# Patient Record
Sex: MALE | Race: WHITE | NOT HISPANIC OR LATINO | URBAN - METROPOLITAN AREA
[De-identification: names, ages, dates, MRNs, and addresses within clinical notes are randomized per-mention and may not be internally consistent; named-entity substitution may affect disease eponyms.]

---

## 2017-05-05 ENCOUNTER — OUTPATIENT (OUTPATIENT)
Dept: OUTPATIENT SERVICES | Facility: HOSPITAL | Age: 77
LOS: 1 days | Discharge: HOME | End: 2017-05-05

## 2017-05-05 DIAGNOSIS — R26.0 ATAXIC GAIT: ICD-10-CM

## 2017-05-05 DIAGNOSIS — M15.0 PRIMARY GENERALIZED (OSTEO)ARTHRITIS: ICD-10-CM

## 2017-05-24 ENCOUNTER — OUTPATIENT (OUTPATIENT)
Dept: OUTPATIENT SERVICES | Facility: HOSPITAL | Age: 77
LOS: 1 days | Discharge: HOME | End: 2017-05-24

## 2017-06-22 ENCOUNTER — OUTPATIENT (OUTPATIENT)
Dept: OUTPATIENT SERVICES | Facility: HOSPITAL | Age: 77
LOS: 1 days | Discharge: HOME | End: 2017-06-22
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
## 2017-06-22 DIAGNOSIS — S09.90XA UNSPECIFIED INJURY OF HEAD, INITIAL ENCOUNTER: ICD-10-CM

## 2017-06-26 ENCOUNTER — OUTPATIENT (OUTPATIENT)
Dept: OUTPATIENT SERVICES | Facility: HOSPITAL | Age: 77
LOS: 1 days | Discharge: HOME | End: 2017-06-26
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
## 2017-06-26 DIAGNOSIS — S09.90XA UNSPECIFIED INJURY OF HEAD, INITIAL ENCOUNTER: ICD-10-CM

## 2017-06-28 DIAGNOSIS — Z79.01 LONG TERM (CURRENT) USE OF ANTICOAGULANTS: ICD-10-CM

## 2017-06-28 DIAGNOSIS — I82.91 CHRONIC EMBOLISM AND THROMBOSIS OF UNSPECIFIED VEIN: ICD-10-CM

## 2017-07-11 ENCOUNTER — OUTPATIENT (OUTPATIENT)
Dept: OUTPATIENT SERVICES | Facility: HOSPITAL | Age: 77
LOS: 1 days | Discharge: HOME | End: 2017-07-11
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
## 2017-07-11 DIAGNOSIS — Z79.01 LONG TERM (CURRENT) USE OF ANTICOAGULANTS: ICD-10-CM

## 2017-07-11 DIAGNOSIS — I82.91 CHRONIC EMBOLISM AND THROMBOSIS OF UNSPECIFIED VEIN: ICD-10-CM

## 2017-07-11 DIAGNOSIS — S09.90XA UNSPECIFIED INJURY OF HEAD, INITIAL ENCOUNTER: ICD-10-CM

## 2017-07-21 ENCOUNTER — OUTPATIENT (OUTPATIENT)
Dept: OUTPATIENT SERVICES | Facility: HOSPITAL | Age: 77
LOS: 1 days | Discharge: HOME | End: 2017-07-21

## 2017-07-21 DIAGNOSIS — S09.90XA UNSPECIFIED INJURY OF HEAD, INITIAL ENCOUNTER: ICD-10-CM

## 2017-07-21 DIAGNOSIS — I82.91 CHRONIC EMBOLISM AND THROMBOSIS OF UNSPECIFIED VEIN: ICD-10-CM

## 2017-07-21 DIAGNOSIS — Z79.01 LONG TERM (CURRENT) USE OF ANTICOAGULANTS: ICD-10-CM
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
## 2017-07-28 ENCOUNTER — OUTPATIENT (OUTPATIENT)
Dept: OUTPATIENT SERVICES | Facility: HOSPITAL | Age: 77
LOS: 1 days | Discharge: HOME | End: 2017-07-28

## 2017-07-28 DIAGNOSIS — S09.90XA UNSPECIFIED INJURY OF HEAD, INITIAL ENCOUNTER: ICD-10-CM
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
## 2017-07-28 DIAGNOSIS — I82.91 CHRONIC EMBOLISM AND THROMBOSIS OF UNSPECIFIED VEIN: ICD-10-CM

## 2017-07-28 DIAGNOSIS — Z79.01 LONG TERM (CURRENT) USE OF ANTICOAGULANTS: ICD-10-CM

## 2017-08-14 ENCOUNTER — OUTPATIENT (OUTPATIENT)
Dept: OUTPATIENT SERVICES | Facility: HOSPITAL | Age: 77
LOS: 1 days | Discharge: HOME | End: 2017-08-14

## 2017-08-14 DIAGNOSIS — S09.90XA UNSPECIFIED INJURY OF HEAD, INITIAL ENCOUNTER: ICD-10-CM
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
## 2017-08-17 ENCOUNTER — OUTPATIENT (OUTPATIENT)
Dept: OUTPATIENT SERVICES | Facility: HOSPITAL | Age: 77
LOS: 1 days | Discharge: HOME | End: 2017-08-17
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
## 2017-08-17 DIAGNOSIS — S09.90XA UNSPECIFIED INJURY OF HEAD, INITIAL ENCOUNTER: ICD-10-CM

## 2017-08-22 DIAGNOSIS — H25.89 OTHER AGE-RELATED CATARACT: ICD-10-CM

## 2017-08-22 DIAGNOSIS — E11.9 TYPE 2 DIABETES MELLITUS WITHOUT COMPLICATIONS: ICD-10-CM

## 2017-08-22 DIAGNOSIS — I10 ESSENTIAL (PRIMARY) HYPERTENSION: ICD-10-CM

## 2017-08-23 ENCOUNTER — OUTPATIENT (OUTPATIENT)
Dept: OUTPATIENT SERVICES | Facility: HOSPITAL | Age: 77
LOS: 1 days | Discharge: HOME | End: 2017-08-23

## 2017-08-23 DIAGNOSIS — S09.90XA UNSPECIFIED INJURY OF HEAD, INITIAL ENCOUNTER: ICD-10-CM

## 2017-08-23 DIAGNOSIS — I82.91 CHRONIC EMBOLISM AND THROMBOSIS OF UNSPECIFIED VEIN: ICD-10-CM
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
## 2017-08-23 DIAGNOSIS — Z79.01 LONG TERM (CURRENT) USE OF ANTICOAGULANTS: ICD-10-CM

## 2017-08-30 ENCOUNTER — OUTPATIENT (OUTPATIENT)
Dept: OUTPATIENT SERVICES | Facility: HOSPITAL | Age: 77
LOS: 1 days | Discharge: HOME | End: 2017-08-30

## 2017-08-30 DIAGNOSIS — Z79.01 LONG TERM (CURRENT) USE OF ANTICOAGULANTS: ICD-10-CM

## 2017-08-30 DIAGNOSIS — I82.91 CHRONIC EMBOLISM AND THROMBOSIS OF UNSPECIFIED VEIN: ICD-10-CM
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
## 2017-08-30 DIAGNOSIS — S09.90XA UNSPECIFIED INJURY OF HEAD, INITIAL ENCOUNTER: ICD-10-CM

## 2017-09-06 ENCOUNTER — OUTPATIENT (OUTPATIENT)
Dept: OUTPATIENT SERVICES | Facility: HOSPITAL | Age: 77
LOS: 1 days | Discharge: HOME | End: 2017-09-06

## 2017-09-06 DIAGNOSIS — I82.91 CHRONIC EMBOLISM AND THROMBOSIS OF UNSPECIFIED VEIN: ICD-10-CM

## 2017-09-06 DIAGNOSIS — Z79.01 LONG TERM (CURRENT) USE OF ANTICOAGULANTS: ICD-10-CM
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
## 2017-09-06 DIAGNOSIS — S09.90XA UNSPECIFIED INJURY OF HEAD, INITIAL ENCOUNTER: ICD-10-CM

## 2017-09-20 ENCOUNTER — OUTPATIENT (OUTPATIENT)
Dept: OUTPATIENT SERVICES | Facility: HOSPITAL | Age: 77
LOS: 1 days | Discharge: HOME | End: 2017-09-20

## 2017-09-20 DIAGNOSIS — Z79.01 LONG TERM (CURRENT) USE OF ANTICOAGULANTS: ICD-10-CM

## 2017-09-20 DIAGNOSIS — I82.91 CHRONIC EMBOLISM AND THROMBOSIS OF UNSPECIFIED VEIN: ICD-10-CM
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
## 2017-09-20 DIAGNOSIS — S09.90XA UNSPECIFIED INJURY OF HEAD, INITIAL ENCOUNTER: ICD-10-CM

## 2017-09-27 ENCOUNTER — OUTPATIENT (OUTPATIENT)
Dept: OUTPATIENT SERVICES | Facility: HOSPITAL | Age: 77
LOS: 1 days | Discharge: HOME | End: 2017-09-27

## 2017-09-27 DIAGNOSIS — S09.90XA UNSPECIFIED INJURY OF HEAD, INITIAL ENCOUNTER: ICD-10-CM
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
## 2017-09-27 DIAGNOSIS — Z79.01 LONG TERM (CURRENT) USE OF ANTICOAGULANTS: ICD-10-CM

## 2017-09-27 DIAGNOSIS — I82.91 CHRONIC EMBOLISM AND THROMBOSIS OF UNSPECIFIED VEIN: ICD-10-CM

## 2017-10-11 ENCOUNTER — OUTPATIENT (OUTPATIENT)
Dept: OUTPATIENT SERVICES | Facility: HOSPITAL | Age: 77
LOS: 1 days | Discharge: HOME | End: 2017-10-11
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
## 2017-10-11 DIAGNOSIS — S09.90XA UNSPECIFIED INJURY OF HEAD, INITIAL ENCOUNTER: ICD-10-CM

## 2017-10-11 DIAGNOSIS — Z79.01 LONG TERM (CURRENT) USE OF ANTICOAGULANTS: ICD-10-CM

## 2017-10-11 DIAGNOSIS — I82.91 CHRONIC EMBOLISM AND THROMBOSIS OF UNSPECIFIED VEIN: ICD-10-CM

## 2017-10-18 ENCOUNTER — OUTPATIENT (OUTPATIENT)
Dept: OUTPATIENT SERVICES | Facility: HOSPITAL | Age: 77
LOS: 1 days | Discharge: HOME | End: 2017-10-18

## 2017-10-18 DIAGNOSIS — Z79.01 LONG TERM (CURRENT) USE OF ANTICOAGULANTS: ICD-10-CM

## 2017-10-18 DIAGNOSIS — S09.90XA UNSPECIFIED INJURY OF HEAD, INITIAL ENCOUNTER: ICD-10-CM

## 2017-10-18 DIAGNOSIS — I82.91 CHRONIC EMBOLISM AND THROMBOSIS OF UNSPECIFIED VEIN: ICD-10-CM
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
## 2017-10-26 ENCOUNTER — OUTPATIENT (OUTPATIENT)
Dept: OUTPATIENT SERVICES | Facility: HOSPITAL | Age: 77
LOS: 1 days | Discharge: HOME | End: 2017-10-26

## 2017-10-26 DIAGNOSIS — Z79.01 LONG TERM (CURRENT) USE OF ANTICOAGULANTS: ICD-10-CM

## 2017-10-26 DIAGNOSIS — S09.90XA UNSPECIFIED INJURY OF HEAD, INITIAL ENCOUNTER: ICD-10-CM
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
## 2017-10-26 DIAGNOSIS — I82.91 CHRONIC EMBOLISM AND THROMBOSIS OF UNSPECIFIED VEIN: ICD-10-CM

## 2017-11-08 ENCOUNTER — OUTPATIENT (OUTPATIENT)
Dept: OUTPATIENT SERVICES | Facility: HOSPITAL | Age: 77
LOS: 1 days | Discharge: HOME | End: 2017-11-08

## 2017-11-08 DIAGNOSIS — Z79.01 LONG TERM (CURRENT) USE OF ANTICOAGULANTS: ICD-10-CM

## 2017-11-08 DIAGNOSIS — S09.90XA UNSPECIFIED INJURY OF HEAD, INITIAL ENCOUNTER: ICD-10-CM

## 2017-11-08 DIAGNOSIS — I82.91 CHRONIC EMBOLISM AND THROMBOSIS OF UNSPECIFIED VEIN: ICD-10-CM
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
## 2017-11-29 ENCOUNTER — OUTPATIENT (OUTPATIENT)
Dept: OUTPATIENT SERVICES | Facility: HOSPITAL | Age: 77
LOS: 1 days | Discharge: HOME | End: 2017-11-29

## 2017-11-29 DIAGNOSIS — I82.91 CHRONIC EMBOLISM AND THROMBOSIS OF UNSPECIFIED VEIN: ICD-10-CM

## 2017-11-29 DIAGNOSIS — S09.90XA UNSPECIFIED INJURY OF HEAD, INITIAL ENCOUNTER: ICD-10-CM

## 2017-11-29 DIAGNOSIS — Z79.01 LONG TERM (CURRENT) USE OF ANTICOAGULANTS: ICD-10-CM
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
## 2017-12-06 ENCOUNTER — OUTPATIENT (OUTPATIENT)
Dept: OUTPATIENT SERVICES | Facility: HOSPITAL | Age: 77
LOS: 1 days | Discharge: HOME | End: 2017-12-06

## 2017-12-06 DIAGNOSIS — Z79.01 LONG TERM (CURRENT) USE OF ANTICOAGULANTS: ICD-10-CM

## 2017-12-06 DIAGNOSIS — I82.91 CHRONIC EMBOLISM AND THROMBOSIS OF UNSPECIFIED VEIN: ICD-10-CM
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
## 2017-12-06 DIAGNOSIS — S09.90XA UNSPECIFIED INJURY OF HEAD, INITIAL ENCOUNTER: ICD-10-CM

## 2017-12-15 ENCOUNTER — OUTPATIENT (OUTPATIENT)
Dept: OUTPATIENT SERVICES | Facility: HOSPITAL | Age: 77
LOS: 1 days | Discharge: HOME | End: 2017-12-15

## 2017-12-15 DIAGNOSIS — I82.91 CHRONIC EMBOLISM AND THROMBOSIS OF UNSPECIFIED VEIN: ICD-10-CM

## 2017-12-15 DIAGNOSIS — Z79.01 LONG TERM (CURRENT) USE OF ANTICOAGULANTS: ICD-10-CM
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
## 2017-12-15 DIAGNOSIS — S09.90XA UNSPECIFIED INJURY OF HEAD, INITIAL ENCOUNTER: ICD-10-CM

## 2017-12-21 ENCOUNTER — OUTPATIENT (OUTPATIENT)
Dept: OUTPATIENT SERVICES | Facility: HOSPITAL | Age: 77
LOS: 1 days | Discharge: HOME | End: 2017-12-21

## 2017-12-21 DIAGNOSIS — I82.91 CHRONIC EMBOLISM AND THROMBOSIS OF UNSPECIFIED VEIN: ICD-10-CM
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
## 2017-12-21 DIAGNOSIS — Z79.01 LONG TERM (CURRENT) USE OF ANTICOAGULANTS: ICD-10-CM

## 2017-12-21 DIAGNOSIS — S09.90XA UNSPECIFIED INJURY OF HEAD, INITIAL ENCOUNTER: ICD-10-CM

## 2018-01-03 ENCOUNTER — OUTPATIENT (OUTPATIENT)
Dept: OUTPATIENT SERVICES | Facility: HOSPITAL | Age: 78
LOS: 1 days | Discharge: HOME | End: 2018-01-03

## 2018-01-03 DIAGNOSIS — S09.90XA UNSPECIFIED INJURY OF HEAD, INITIAL ENCOUNTER: ICD-10-CM

## 2018-01-03 DIAGNOSIS — I82.91 CHRONIC EMBOLISM AND THROMBOSIS OF UNSPECIFIED VEIN: ICD-10-CM

## 2018-01-03 DIAGNOSIS — Z79.01 LONG TERM (CURRENT) USE OF ANTICOAGULANTS: ICD-10-CM
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
## 2018-01-10 ENCOUNTER — OUTPATIENT (OUTPATIENT)
Dept: OUTPATIENT SERVICES | Facility: HOSPITAL | Age: 78
LOS: 1 days | Discharge: HOME | End: 2018-01-10

## 2018-01-10 DIAGNOSIS — I82.91 CHRONIC EMBOLISM AND THROMBOSIS OF UNSPECIFIED VEIN: ICD-10-CM

## 2018-01-10 DIAGNOSIS — S09.90XA UNSPECIFIED INJURY OF HEAD, INITIAL ENCOUNTER: ICD-10-CM

## 2018-01-10 DIAGNOSIS — Z79.01 LONG TERM (CURRENT) USE OF ANTICOAGULANTS: ICD-10-CM
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
## 2018-01-18 ENCOUNTER — OUTPATIENT (OUTPATIENT)
Dept: OUTPATIENT SERVICES | Facility: HOSPITAL | Age: 78
LOS: 1 days | Discharge: HOME | End: 2018-01-18
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
## 2018-01-18 DIAGNOSIS — Z79.01 LONG TERM (CURRENT) USE OF ANTICOAGULANTS: ICD-10-CM

## 2018-01-18 DIAGNOSIS — S09.90XA UNSPECIFIED INJURY OF HEAD, INITIAL ENCOUNTER: ICD-10-CM

## 2018-01-18 DIAGNOSIS — I82.91 CHRONIC EMBOLISM AND THROMBOSIS OF UNSPECIFIED VEIN: ICD-10-CM

## 2018-01-31 ENCOUNTER — OUTPATIENT (OUTPATIENT)
Dept: OUTPATIENT SERVICES | Facility: HOSPITAL | Age: 78
LOS: 1 days | Discharge: HOME | End: 2018-01-31

## 2018-01-31 DIAGNOSIS — Z79.01 LONG TERM (CURRENT) USE OF ANTICOAGULANTS: ICD-10-CM

## 2018-01-31 DIAGNOSIS — I82.91 CHRONIC EMBOLISM AND THROMBOSIS OF UNSPECIFIED VEIN: ICD-10-CM

## 2018-02-04 DIAGNOSIS — S09.90XA UNSPECIFIED INJURY OF HEAD, INITIAL ENCOUNTER: ICD-10-CM
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
## 2018-02-07 ENCOUNTER — OUTPATIENT (OUTPATIENT)
Dept: OUTPATIENT SERVICES | Facility: HOSPITAL | Age: 78
LOS: 1 days | Discharge: HOME | End: 2018-02-07

## 2018-02-07 DIAGNOSIS — I82.91 CHRONIC EMBOLISM AND THROMBOSIS OF UNSPECIFIED VEIN: ICD-10-CM

## 2018-02-07 DIAGNOSIS — Z79.01 LONG TERM (CURRENT) USE OF ANTICOAGULANTS: ICD-10-CM

## 2018-02-14 ENCOUNTER — OUTPATIENT (OUTPATIENT)
Dept: OUTPATIENT SERVICES | Facility: HOSPITAL | Age: 78
LOS: 1 days | Discharge: HOME | End: 2018-02-14

## 2018-02-14 DIAGNOSIS — Z79.01 LONG TERM (CURRENT) USE OF ANTICOAGULANTS: ICD-10-CM

## 2018-02-14 DIAGNOSIS — I82.91 CHRONIC EMBOLISM AND THROMBOSIS OF UNSPECIFIED VEIN: ICD-10-CM

## 2018-02-21 ENCOUNTER — OUTPATIENT (OUTPATIENT)
Dept: OUTPATIENT SERVICES | Facility: HOSPITAL | Age: 78
LOS: 1 days | Discharge: HOME | End: 2018-02-21

## 2018-02-21 DIAGNOSIS — Z79.01 LONG TERM (CURRENT) USE OF ANTICOAGULANTS: ICD-10-CM

## 2018-02-21 DIAGNOSIS — I82.91 CHRONIC EMBOLISM AND THROMBOSIS OF UNSPECIFIED VEIN: ICD-10-CM

## 2018-03-07 ENCOUNTER — OUTPATIENT (OUTPATIENT)
Dept: OUTPATIENT SERVICES | Facility: HOSPITAL | Age: 78
LOS: 1 days | Discharge: HOME | End: 2018-03-07

## 2018-03-07 DIAGNOSIS — I82.91 CHRONIC EMBOLISM AND THROMBOSIS OF UNSPECIFIED VEIN: ICD-10-CM

## 2018-03-07 DIAGNOSIS — Z79.01 LONG TERM (CURRENT) USE OF ANTICOAGULANTS: ICD-10-CM

## 2018-03-23 ENCOUNTER — OUTPATIENT (OUTPATIENT)
Dept: OUTPATIENT SERVICES | Facility: HOSPITAL | Age: 78
LOS: 1 days | Discharge: HOME | End: 2018-03-23

## 2018-03-23 DIAGNOSIS — I82.91 CHRONIC EMBOLISM AND THROMBOSIS OF UNSPECIFIED VEIN: ICD-10-CM

## 2018-03-23 DIAGNOSIS — Z79.01 LONG TERM (CURRENT) USE OF ANTICOAGULANTS: ICD-10-CM

## 2018-04-11 ENCOUNTER — OUTPATIENT (OUTPATIENT)
Dept: OUTPATIENT SERVICES | Facility: HOSPITAL | Age: 78
LOS: 1 days | Discharge: HOME | End: 2018-04-11

## 2018-04-11 DIAGNOSIS — Z79.01 LONG TERM (CURRENT) USE OF ANTICOAGULANTS: ICD-10-CM

## 2018-04-11 DIAGNOSIS — I82.91 CHRONIC EMBOLISM AND THROMBOSIS OF UNSPECIFIED VEIN: ICD-10-CM

## 2018-04-25 ENCOUNTER — OUTPATIENT (OUTPATIENT)
Dept: OUTPATIENT SERVICES | Facility: HOSPITAL | Age: 78
LOS: 1 days | Discharge: HOME | End: 2018-04-25

## 2018-04-25 DIAGNOSIS — I82.91 CHRONIC EMBOLISM AND THROMBOSIS OF UNSPECIFIED VEIN: ICD-10-CM

## 2018-04-25 DIAGNOSIS — Z79.01 LONG TERM (CURRENT) USE OF ANTICOAGULANTS: ICD-10-CM

## 2018-05-17 ENCOUNTER — OUTPATIENT (OUTPATIENT)
Dept: OUTPATIENT SERVICES | Facility: HOSPITAL | Age: 78
LOS: 1 days | Discharge: HOME | End: 2018-05-17

## 2018-05-17 DIAGNOSIS — I82.91 CHRONIC EMBOLISM AND THROMBOSIS OF UNSPECIFIED VEIN: ICD-10-CM

## 2018-05-17 DIAGNOSIS — Z79.01 LONG TERM (CURRENT) USE OF ANTICOAGULANTS: ICD-10-CM

## 2018-05-17 DIAGNOSIS — I48.91 UNSPECIFIED ATRIAL FIBRILLATION: ICD-10-CM

## 2018-06-06 ENCOUNTER — OUTPATIENT (OUTPATIENT)
Dept: OUTPATIENT SERVICES | Facility: HOSPITAL | Age: 78
LOS: 1 days | Discharge: HOME | End: 2018-06-06

## 2018-06-06 DIAGNOSIS — Z79.01 LONG TERM (CURRENT) USE OF ANTICOAGULANTS: ICD-10-CM

## 2018-06-06 DIAGNOSIS — I82.91 CHRONIC EMBOLISM AND THROMBOSIS OF UNSPECIFIED VEIN: ICD-10-CM

## 2018-06-20 ENCOUNTER — OUTPATIENT (OUTPATIENT)
Dept: OUTPATIENT SERVICES | Facility: HOSPITAL | Age: 78
LOS: 1 days | Discharge: HOME | End: 2018-06-20

## 2018-06-20 DIAGNOSIS — I82.91 CHRONIC EMBOLISM AND THROMBOSIS OF UNSPECIFIED VEIN: ICD-10-CM

## 2018-06-20 DIAGNOSIS — Z79.01 LONG TERM (CURRENT) USE OF ANTICOAGULANTS: ICD-10-CM

## 2018-06-29 ENCOUNTER — OUTPATIENT (OUTPATIENT)
Dept: OUTPATIENT SERVICES | Facility: HOSPITAL | Age: 78
LOS: 1 days | Discharge: HOME | End: 2018-06-29

## 2018-06-29 DIAGNOSIS — I82.91 CHRONIC EMBOLISM AND THROMBOSIS OF UNSPECIFIED VEIN: ICD-10-CM

## 2018-06-29 DIAGNOSIS — Z79.01 LONG TERM (CURRENT) USE OF ANTICOAGULANTS: ICD-10-CM

## 2018-07-06 ENCOUNTER — OUTPATIENT (OUTPATIENT)
Dept: OUTPATIENT SERVICES | Facility: HOSPITAL | Age: 78
LOS: 1 days | Discharge: HOME | End: 2018-07-06

## 2018-07-06 DIAGNOSIS — I82.91 CHRONIC EMBOLISM AND THROMBOSIS OF UNSPECIFIED VEIN: ICD-10-CM

## 2018-07-06 DIAGNOSIS — Z79.01 LONG TERM (CURRENT) USE OF ANTICOAGULANTS: ICD-10-CM

## 2018-07-20 ENCOUNTER — OUTPATIENT (OUTPATIENT)
Dept: OUTPATIENT SERVICES | Facility: HOSPITAL | Age: 78
LOS: 1 days | Discharge: HOME | End: 2018-07-20

## 2018-07-20 DIAGNOSIS — I82.91 CHRONIC EMBOLISM AND THROMBOSIS OF UNSPECIFIED VEIN: ICD-10-CM

## 2018-07-20 DIAGNOSIS — Z79.01 LONG TERM (CURRENT) USE OF ANTICOAGULANTS: ICD-10-CM

## 2018-07-25 ENCOUNTER — OUTPATIENT (OUTPATIENT)
Dept: OUTPATIENT SERVICES | Facility: HOSPITAL | Age: 78
LOS: 1 days | Discharge: HOME | End: 2018-07-25

## 2018-07-25 DIAGNOSIS — Z79.01 LONG TERM (CURRENT) USE OF ANTICOAGULANTS: ICD-10-CM

## 2018-07-25 DIAGNOSIS — I82.91 CHRONIC EMBOLISM AND THROMBOSIS OF UNSPECIFIED VEIN: ICD-10-CM

## 2018-08-01 ENCOUNTER — OUTPATIENT (OUTPATIENT)
Dept: OUTPATIENT SERVICES | Facility: HOSPITAL | Age: 78
LOS: 1 days | Discharge: HOME | End: 2018-08-01

## 2018-08-01 DIAGNOSIS — I82.91 CHRONIC EMBOLISM AND THROMBOSIS OF UNSPECIFIED VEIN: ICD-10-CM

## 2018-08-01 DIAGNOSIS — Z79.01 LONG TERM (CURRENT) USE OF ANTICOAGULANTS: ICD-10-CM

## 2018-08-15 ENCOUNTER — EMERGENCY (EMERGENCY)
Facility: HOSPITAL | Age: 78
LOS: 0 days | Discharge: HOME | End: 2018-08-15
Attending: EMERGENCY MEDICINE | Admitting: EMERGENCY MEDICINE

## 2018-08-15 ENCOUNTER — OUTPATIENT (OUTPATIENT)
Dept: OUTPATIENT SERVICES | Facility: HOSPITAL | Age: 78
LOS: 1 days | Discharge: HOME | End: 2018-08-15

## 2018-08-15 VITALS
TEMPERATURE: 99 F | SYSTOLIC BLOOD PRESSURE: 189 MMHG | HEART RATE: 80 BPM | OXYGEN SATURATION: 97 % | WEIGHT: 250 LBS | RESPIRATION RATE: 19 BRPM | DIASTOLIC BLOOD PRESSURE: 106 MMHG | HEIGHT: 72 IN
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
## 2018-08-15 DIAGNOSIS — I82.91 CHRONIC EMBOLISM AND THROMBOSIS OF UNSPECIFIED VEIN: ICD-10-CM

## 2018-08-15 DIAGNOSIS — S90.32XA CONTUSION OF LEFT FOOT, INITIAL ENCOUNTER: ICD-10-CM

## 2018-08-15 DIAGNOSIS — E11.9 TYPE 2 DIABETES MELLITUS WITHOUT COMPLICATIONS: ICD-10-CM

## 2018-08-15 DIAGNOSIS — Z79.01 LONG TERM (CURRENT) USE OF ANTICOAGULANTS: ICD-10-CM
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
## 2018-08-15 DIAGNOSIS — R79.1 ABNORMAL COAGULATION PROFILE: ICD-10-CM
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
## 2018-08-15 DIAGNOSIS — S90.822A BLISTER (NONTHERMAL), LEFT FOOT, INITIAL ENCOUNTER: ICD-10-CM

## 2018-08-15 DIAGNOSIS — M79.673 PAIN IN UNSPECIFIED FOOT: ICD-10-CM

## 2018-08-15 DIAGNOSIS — Z79.84 LONG TERM (CURRENT) USE OF ORAL HYPOGLYCEMIC DRUGS: ICD-10-CM
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
## 2018-08-15 NOTE — ED ADULT NURSE NOTE - CAS TRG GENERAL NORM CIRC DET
Capillary refill less/equal to 2 seconds/Bounding peripheral pulses/Strong peripheral pulses/No visible significant external bleeding

## 2018-08-15 NOTE — ED PROVIDER NOTE - ATTENDING CONTRIBUTION TO CARE
Pt is a 77yo male who comes in for L foot blistering and pain.  He tripped and hit a curb yesterdaty.  Still ambulatory.  Went to Coumadin clinic today and INR was 5.3 - was told to hold coumadin for 3d.  No other complaints.    Exam: 2+ DP pulse, L dorsum of foot by toes blood blister, soreness without pain on axial loading, no ankle tenderness  Imp: contusion with blood blisters - supratherapeutic INR  Plan: dc home with darco shoe

## 2018-08-15 NOTE — ED ADULT NURSE NOTE - NSIMPLEMENTINTERV_GEN_ALL_ED
Implemented All Universal Safety Interventions:  [place] to call system. Call bell, personal items and telephone within reach. Instruct patient to call for assistance. Room bathroom lighting operational. Non-slip footwear when patient is off stretcher. Physically safe environment: no spills, clutter or unnecessary equipment. Stretcher in lowest position, wheels locked, appropriate side rails in place.

## 2018-08-15 NOTE — ED PROVIDER NOTE - NS ED ROS FT
Constitutional: See HPI.  Eyes: No visual changes, eye pain or discharge.  ENMT: No hearing changes, pain, discharge or infections. No neck pain or stiffness.  Cardiac: No chest pain, SOB or edema. No chest pain with exertion.  Respiratory: No cough or respiratory distress.   GI: No nausea, vomiting, diarrhea or abdominal pain.  : No dysuria, frequency or burning.  MS: + foot pain as per HPI. + chronic neuropathy. No myalgia, muscle weakness, joint pain or back pain.  Neuro: No headache or weakness. No LOC.  Skin: No skin rash.

## 2018-08-15 NOTE — ED ADULT NURSE NOTE - PAIN RATING/NUMBER SCALE (0-10): REST
Approximately 150-200cc soap suds enema administered posteriorly per Dr. Maharaj Dates to avoid the suture line, pt having difficulty keeping the fluid in, bedside commode at bedside.
4

## 2018-08-15 NOTE — ED PROVIDER NOTE - PHYSICAL EXAMINATION
CONSTITUTIONAL: Well-developed; well-nourished; in no acute distress.   SKIN: warm, dry  CARD: S1, S2 normal; no murmurs, gallops, or rubs. Regular rate and rhythm.   RESP: No wheezes, rales or rhonchi.  ABD: soft ntnd  EXT: LLE: + ecchymosis over the distal aspect of the left foot with mild ttp, no bony crepitus. Distal neurovascular intact. No ankle tenderness with FROM. No clubbing, cyanosis or edema.   NEURO: Alert, oriented, grossly unremarkable  PSYCH: Cooperative, appropriate.

## 2018-08-15 NOTE — ED PROVIDER NOTE - OBJECTIVE STATEMENT
77 y/o male with PMH of afib on coumadin, DM on metformin who present sot Ed for bruising to the left foot.; Pt states he had a mechanical trip and fall yesterday on a curb and hit the left foot. Today, pt noted bruising and pain ot the foot prompting ED visit. No head injury or LOC.

## 2018-08-15 NOTE — ED PROVIDER NOTE - NSCAREINITIATED _GEN_ER
Dr. Kinsey-I called pt Re:lab results and informed him of new thyroid dosage.  He is going to go ahead and  new strength at local pharmacy, but can we also send to [facility] with other meds he needs? (I think you send the others, but he said there was a problem at [facility] and they need to be resubmitted, please).  Thanks.    Jamie Mahmood(Attending)

## 2018-08-15 NOTE — ED PROVIDER NOTE - CARE PLAN
Principal Discharge DX:	Contusion of left foot, initial encounter  Secondary Diagnosis:	Blood blister  Secondary Diagnosis:	Supratherapeutic INR

## 2018-08-17 ENCOUNTER — OUTPATIENT (OUTPATIENT)
Dept: OUTPATIENT SERVICES | Facility: HOSPITAL | Age: 78
LOS: 1 days | Discharge: HOME | End: 2018-08-17

## 2018-08-17 DIAGNOSIS — I82.91 CHRONIC EMBOLISM AND THROMBOSIS OF UNSPECIFIED VEIN: ICD-10-CM

## 2018-08-17 DIAGNOSIS — Z02.9 ENCOUNTER FOR ADMINISTRATIVE EXAMINATIONS, UNSPECIFIED: ICD-10-CM

## 2018-08-17 DIAGNOSIS — Z79.01 LONG TERM (CURRENT) USE OF ANTICOAGULANTS: ICD-10-CM

## 2018-09-06 PROBLEM — Z00.00 ENCOUNTER FOR PREVENTIVE HEALTH EXAMINATION: Status: ACTIVE | Noted: 2018-09-06

## 2018-09-25 ENCOUNTER — APPOINTMENT (OUTPATIENT)
Dept: VASCULAR SURGERY | Facility: CLINIC | Age: 78
End: 2018-09-25

## 2020-02-13 ENCOUNTER — APPOINTMENT (OUTPATIENT)
Dept: NEUROLOGY | Facility: CLINIC | Age: 80
End: 2020-02-13
Payer: MEDICARE

## 2020-02-13 VITALS
TEMPERATURE: 97.7 F | HEART RATE: 76 BPM | WEIGHT: 240 LBS | SYSTOLIC BLOOD PRESSURE: 147 MMHG | OXYGEN SATURATION: 98 % | DIASTOLIC BLOOD PRESSURE: 84 MMHG | BODY MASS INDEX: 32.51 KG/M2 | HEIGHT: 72 IN

## 2020-02-13 DIAGNOSIS — E11.42 TYPE 2 DIABETES MELLITUS WITH DIABETIC POLYNEUROPATHY: ICD-10-CM

## 2020-02-13 PROCEDURE — 99213 OFFICE O/P EST LOW 20 MIN: CPT

## 2020-02-13 NOTE — PHYSICAL EXAM
[FreeTextEntry1] : Neurological Exam: \par Mental status: Awake, alert and oriented x3.  Recent and remote memory intact.  Naming, repetition and comprehension intact.  Attention/concentration intact.  No dysarthria, no aphasia.  Fund of knowledge appropriate.  \par Cranial nerves: Pupils equally round and reactive to light, visual fields full, no nystagmus, extraocular muscles intact, V1 through V3 intact bilaterally and symmetric, face symmetric, hearing intact to finger rub, palate elevation symmetric, tongue was midline.\par Motor:  MRC grading 5/5 b/l UE/LE.   strength 5/5.  Normal tone and bulk.  No abnormal movements.  \par Sensation: Intact to light touch, proprioception, and pinprick. \par Coordination: No dysmetria on finger-to-nose and heel-to-shin.  No dysdiadokinesia.\par Reflexes: hyporeflexic \par Gait: cautious and ambulates with a cane\par \par

## 2020-02-13 NOTE — ASSESSMENT
[FreeTextEntry1] : 78 yo man with diabetic polyneuropathy refractory to high dose gabapentin \par patient failed lyrica in the past \par \par will try cymbalta 60 mg qd\par pt will call me in 2 weeks at which time i will begin neurontin wean

## 2020-02-13 NOTE — HISTORY OF PRESENT ILLNESS
[FreeTextEntry1] : patient is a 80 yo man with hx of diabetic polyneuropathy who returns for follow up today\par patient was previously seen by dr valles and was last seen about 2.5 yr ago\par patient was previously maintained on gabapentin \par patient reports that since last visit, he reports that his feet hurt and he falls down a lot. his hands are numb and tingling as well\par he takes gabapentin 1200 mg q 12\par has tried marijuana, recreational and legal, which did not help\par he did try lyrica which did not help and he couldn’t afford it \par he thinks he may have been on cymbalta but is not sure \par other pmh: prostate cancer, a fib on eliquis \par meds as below

## 2020-12-25 ENCOUNTER — TRANSCRIPTION ENCOUNTER (OUTPATIENT)
Age: 80
End: 2020-12-25

## 2021-01-06 ENCOUNTER — TRANSCRIPTION ENCOUNTER (OUTPATIENT)
Age: 81
End: 2021-01-06

## 2021-08-26 ENCOUNTER — APPOINTMENT (OUTPATIENT)
Dept: NEUROLOGY | Facility: CLINIC | Age: 81
End: 2021-08-26
Payer: MEDICARE

## 2021-08-26 VITALS
SYSTOLIC BLOOD PRESSURE: 149 MMHG | HEART RATE: 69 BPM | BODY MASS INDEX: 32.23 KG/M2 | OXYGEN SATURATION: 98 % | TEMPERATURE: 98.1 F | HEIGHT: 72 IN | DIASTOLIC BLOOD PRESSURE: 73 MMHG | WEIGHT: 238 LBS

## 2021-08-26 PROCEDURE — 99215 OFFICE O/P EST HI 40 MIN: CPT

## 2021-08-26 NOTE — ASSESSMENT
[FreeTextEntry1] : 80 yo RHM w/ h/o AFib on Eliquis, DM, HTN, diabetic neuropathy currently refractory to gabapentin.  PT never filled script for duloxetine therefore recommend duloxetine trial 60 mg QD.  May continue gabapentin at current dose.  Deferring PT despite recommendations.  May see pain specialist for JULIANNA but unlikely to alleviate neuropathic symptoms since also has symptoms in the hands as well.  \par \par Recommendations:\par - continue gabapentin 600 mg BID\par - duloxetine trial 60 mg QD\par - PT if pt willing\par - if doing well with duloxetine, may consider tapering gabapentin\par - RTC in 4 months

## 2021-08-26 NOTE — PHYSICAL EXAM
[FreeTextEntry1] : NAD.  AOx3.  Intact memory.  Speech fluent, nondysarthric.  CN 2 – 12 normal.\par Strength 5/5 b/l UE/LE.  NL tone, bulk.  No abnl movements.  DTRs 0+ LE, 1+ UE.  Plantar response downgoing b/l.  (-) Hoffmans, clonus.  Sensory decreased LT/PP, pain, temp, proprioception and vibration LE to upper 2/3rd shin, decreased fingertips to mid-hand.  NL FTN/HKS.  No dysdiadokinesia.  Gait narrow based uses cane. \par \par

## 2021-08-26 NOTE — HISTORY OF PRESENT ILLNESS
[FreeTextEntry1] : Since his last visit with Dr. Granados, pt has not tried duloxetine despite recommendations due to concerns regarding price.  Can't recall if he took pregabalin in the past but may have caused weight gain.  Has not had improvement with gabapentin 1200 BID so decreased medications to 600 mg BID on his own without any significant changes.  Still has sensation of "walking on marbles" and also has numbness and pinprick sensation in the fingertips with numbness in the legs to upper 2/3rd of the leg still below knees.  Denies any recent falls.  Never had PT in the past and currently deferring PT.  Is otherwise in his usual state of health.  Has appt with pain specialist for JULIANNA since he was told symptoms may be due to back.

## 2023-09-18 ENCOUNTER — APPOINTMENT (OUTPATIENT)
Dept: MEDICATION MANAGEMENT | Facility: CLINIC | Age: 83
End: 2023-09-18

## 2023-09-18 ENCOUNTER — OUTPATIENT (OUTPATIENT)
Dept: OUTPATIENT SERVICES | Facility: HOSPITAL | Age: 83
LOS: 1 days | End: 2023-09-18
Payer: MEDICARE

## 2023-09-18 DIAGNOSIS — I82.91 CHRONIC EMBOLISM AND THROMBOSIS OF UNSPECIFIED VEIN: ICD-10-CM

## 2023-09-18 DIAGNOSIS — Z79.01 LONG TERM (CURRENT) USE OF ANTICOAGULANTS: ICD-10-CM

## 2023-09-18 LAB
INR PPP: 1.1 RATIO
POCT-PROTHROMBIN TIME: 12.3 SECS
QUALITY CONTROL: YES

## 2023-09-18 PROCEDURE — 99211 OFF/OP EST MAY X REQ PHY/QHP: CPT

## 2023-09-18 PROCEDURE — 36416 COLLJ CAPILLARY BLOOD SPEC: CPT

## 2023-09-18 PROCEDURE — 85610 PROTHROMBIN TIME: CPT

## 2023-09-19 DIAGNOSIS — I82.91 CHRONIC EMBOLISM AND THROMBOSIS OF UNSPECIFIED VEIN: ICD-10-CM

## 2023-09-19 DIAGNOSIS — Z79.01 LONG TERM (CURRENT) USE OF ANTICOAGULANTS: ICD-10-CM

## 2023-09-22 ENCOUNTER — APPOINTMENT (OUTPATIENT)
Dept: MEDICATION MANAGEMENT | Facility: CLINIC | Age: 83
End: 2023-09-22

## 2023-09-22 ENCOUNTER — OUTPATIENT (OUTPATIENT)
Dept: OUTPATIENT SERVICES | Facility: HOSPITAL | Age: 83
LOS: 1 days | End: 2023-09-22
Payer: MEDICARE

## 2023-09-22 DIAGNOSIS — Z51.81 ENCOUNTER FOR THERAPEUTIC DRUG LVL MONITORING: ICD-10-CM

## 2023-09-22 DIAGNOSIS — Z79.01 LONG TERM (CURRENT) USE OF ANTICOAGULANTS: ICD-10-CM

## 2023-09-22 DIAGNOSIS — Z79.01 ENCOUNTER FOR THERAPEUTIC DRUG LVL MONITORING: ICD-10-CM

## 2023-09-22 DIAGNOSIS — I82.91 CHRONIC EMBOLISM AND THROMBOSIS OF UNSPECIFIED VEIN: ICD-10-CM

## 2023-09-22 DIAGNOSIS — I10 ESSENTIAL (PRIMARY) HYPERTENSION: ICD-10-CM

## 2023-09-22 DIAGNOSIS — I48.91 UNSPECIFIED ATRIAL FIBRILLATION: ICD-10-CM

## 2023-09-22 LAB
INR PPP: 2.3 RATIO
POCT-PROTHROMBIN TIME: 27.6 SECS
QUALITY CONTROL: YES

## 2023-09-22 PROCEDURE — 85610 PROTHROMBIN TIME: CPT

## 2023-09-22 PROCEDURE — 99211 OFF/OP EST MAY X REQ PHY/QHP: CPT

## 2023-09-22 PROCEDURE — 36416 COLLJ CAPILLARY BLOOD SPEC: CPT

## 2023-09-23 DIAGNOSIS — I82.91 CHRONIC EMBOLISM AND THROMBOSIS OF UNSPECIFIED VEIN: ICD-10-CM

## 2023-09-23 DIAGNOSIS — Z79.01 LONG TERM (CURRENT) USE OF ANTICOAGULANTS: ICD-10-CM

## 2023-09-27 ENCOUNTER — OUTPATIENT (OUTPATIENT)
Dept: OUTPATIENT SERVICES | Facility: HOSPITAL | Age: 83
LOS: 1 days | End: 2023-09-27
Payer: MEDICARE

## 2023-09-27 ENCOUNTER — APPOINTMENT (OUTPATIENT)
Dept: MEDICATION MANAGEMENT | Facility: CLINIC | Age: 83
End: 2023-09-27

## 2023-09-27 DIAGNOSIS — Z79.01 LONG TERM (CURRENT) USE OF ANTICOAGULANTS: ICD-10-CM

## 2023-09-27 DIAGNOSIS — I82.91 CHRONIC EMBOLISM AND THROMBOSIS OF UNSPECIFIED VEIN: ICD-10-CM

## 2023-09-27 LAB
INR PPP: 2.8 RATIO
POCT-PROTHROMBIN TIME: 33.1 SECS
QUALITY CONTROL: YES

## 2023-09-27 PROCEDURE — 99211 OFF/OP EST MAY X REQ PHY/QHP: CPT

## 2023-09-27 PROCEDURE — 36416 COLLJ CAPILLARY BLOOD SPEC: CPT

## 2023-09-27 PROCEDURE — 85610 PROTHROMBIN TIME: CPT

## 2023-09-28 DIAGNOSIS — I82.91 CHRONIC EMBOLISM AND THROMBOSIS OF UNSPECIFIED VEIN: ICD-10-CM

## 2023-09-28 DIAGNOSIS — Z79.01 LONG TERM (CURRENT) USE OF ANTICOAGULANTS: ICD-10-CM

## 2023-10-04 ENCOUNTER — OUTPATIENT (OUTPATIENT)
Dept: OUTPATIENT SERVICES | Facility: HOSPITAL | Age: 83
LOS: 1 days | End: 2023-10-04
Payer: MEDICARE

## 2023-10-04 ENCOUNTER — APPOINTMENT (OUTPATIENT)
Dept: MEDICATION MANAGEMENT | Facility: CLINIC | Age: 83
End: 2023-10-04

## 2023-10-04 DIAGNOSIS — I82.91 CHRONIC EMBOLISM AND THROMBOSIS OF UNSPECIFIED VEIN: ICD-10-CM

## 2023-10-04 DIAGNOSIS — Z79.01 LONG TERM (CURRENT) USE OF ANTICOAGULANTS: ICD-10-CM

## 2023-10-04 PROCEDURE — 99211 OFF/OP EST MAY X REQ PHY/QHP: CPT

## 2023-10-04 PROCEDURE — 85610 PROTHROMBIN TIME: CPT

## 2023-10-04 PROCEDURE — 36416 COLLJ CAPILLARY BLOOD SPEC: CPT

## 2023-10-05 DIAGNOSIS — Z79.01 LONG TERM (CURRENT) USE OF ANTICOAGULANTS: ICD-10-CM

## 2023-10-05 DIAGNOSIS — I82.91 CHRONIC EMBOLISM AND THROMBOSIS OF UNSPECIFIED VEIN: ICD-10-CM

## 2023-10-06 LAB
INR PPP: 1.3 RATIO
POCT-PROTHROMBIN TIME: 15.1 SECS
QUALITY CONTROL: YES

## 2023-10-11 ENCOUNTER — OUTPATIENT (OUTPATIENT)
Dept: OUTPATIENT SERVICES | Facility: HOSPITAL | Age: 83
LOS: 1 days | End: 2023-10-11
Payer: MEDICARE

## 2023-10-11 ENCOUNTER — APPOINTMENT (OUTPATIENT)
Dept: MEDICATION MANAGEMENT | Facility: CLINIC | Age: 83
End: 2023-10-11

## 2023-10-11 DIAGNOSIS — I82.91 CHRONIC EMBOLISM AND THROMBOSIS OF UNSPECIFIED VEIN: ICD-10-CM

## 2023-10-11 DIAGNOSIS — Z79.01 LONG TERM (CURRENT) USE OF ANTICOAGULANTS: ICD-10-CM

## 2023-10-11 LAB
INR PPP: 1.9 RATIO
POCT-PROTHROMBIN TIME: 22.3 SECS
QUALITY CONTROL: YES

## 2023-10-11 PROCEDURE — 36416 COLLJ CAPILLARY BLOOD SPEC: CPT

## 2023-10-11 PROCEDURE — 99211 OFF/OP EST MAY X REQ PHY/QHP: CPT

## 2023-10-11 PROCEDURE — 85610 PROTHROMBIN TIME: CPT

## 2023-10-12 DIAGNOSIS — Z79.01 LONG TERM (CURRENT) USE OF ANTICOAGULANTS: ICD-10-CM

## 2023-10-12 DIAGNOSIS — I82.91 CHRONIC EMBOLISM AND THROMBOSIS OF UNSPECIFIED VEIN: ICD-10-CM

## 2023-10-19 ENCOUNTER — OUTPATIENT (OUTPATIENT)
Dept: OUTPATIENT SERVICES | Facility: HOSPITAL | Age: 83
LOS: 1 days | End: 2023-10-19
Payer: MEDICARE

## 2023-10-19 ENCOUNTER — APPOINTMENT (OUTPATIENT)
Dept: MEDICATION MANAGEMENT | Facility: CLINIC | Age: 83
End: 2023-10-19

## 2023-10-19 DIAGNOSIS — I82.91 CHRONIC EMBOLISM AND THROMBOSIS OF UNSPECIFIED VEIN: ICD-10-CM

## 2023-10-19 DIAGNOSIS — Z79.01 LONG TERM (CURRENT) USE OF ANTICOAGULANTS: ICD-10-CM

## 2023-10-19 LAB
INR PPP: 2.4 RATIO
POCT-PROTHROMBIN TIME: 28.9 SECS
QUALITY CONTROL: YES

## 2023-10-19 PROCEDURE — 99211 OFF/OP EST MAY X REQ PHY/QHP: CPT

## 2023-10-19 PROCEDURE — 85610 PROTHROMBIN TIME: CPT

## 2023-10-19 PROCEDURE — 36416 COLLJ CAPILLARY BLOOD SPEC: CPT

## 2023-10-20 DIAGNOSIS — Z79.01 LONG TERM (CURRENT) USE OF ANTICOAGULANTS: ICD-10-CM

## 2023-10-20 DIAGNOSIS — I82.91 CHRONIC EMBOLISM AND THROMBOSIS OF UNSPECIFIED VEIN: ICD-10-CM

## 2023-10-25 ENCOUNTER — APPOINTMENT (OUTPATIENT)
Dept: MEDICATION MANAGEMENT | Facility: CLINIC | Age: 83
End: 2023-10-25

## 2023-10-25 ENCOUNTER — OUTPATIENT (OUTPATIENT)
Dept: OUTPATIENT SERVICES | Facility: HOSPITAL | Age: 83
LOS: 1 days | End: 2023-10-25
Payer: MEDICARE

## 2023-10-25 DIAGNOSIS — I82.91 CHRONIC EMBOLISM AND THROMBOSIS OF UNSPECIFIED VEIN: ICD-10-CM

## 2023-10-25 DIAGNOSIS — Z79.01 LONG TERM (CURRENT) USE OF ANTICOAGULANTS: ICD-10-CM

## 2023-10-25 LAB
INR PPP: 1.7 RATIO
POCT-PROTHROMBIN TIME: 20.1 SECS
QUALITY CONTROL: YES

## 2023-10-25 PROCEDURE — 36416 COLLJ CAPILLARY BLOOD SPEC: CPT

## 2023-10-25 PROCEDURE — 99211 OFF/OP EST MAY X REQ PHY/QHP: CPT

## 2023-10-25 PROCEDURE — 85610 PROTHROMBIN TIME: CPT

## 2023-10-26 DIAGNOSIS — Z79.01 LONG TERM (CURRENT) USE OF ANTICOAGULANTS: ICD-10-CM

## 2023-10-26 DIAGNOSIS — I82.91 CHRONIC EMBOLISM AND THROMBOSIS OF UNSPECIFIED VEIN: ICD-10-CM

## 2023-11-03 ENCOUNTER — APPOINTMENT (OUTPATIENT)
Dept: MEDICATION MANAGEMENT | Facility: CLINIC | Age: 83
End: 2023-11-03

## 2023-11-03 ENCOUNTER — OUTPATIENT (OUTPATIENT)
Dept: OUTPATIENT SERVICES | Facility: HOSPITAL | Age: 83
LOS: 1 days | End: 2023-11-03
Payer: MEDICARE

## 2023-11-03 DIAGNOSIS — I82.91 CHRONIC EMBOLISM AND THROMBOSIS OF UNSPECIFIED VEIN: ICD-10-CM

## 2023-11-03 DIAGNOSIS — Z79.01 LONG TERM (CURRENT) USE OF ANTICOAGULANTS: ICD-10-CM

## 2023-11-03 LAB
INR PPP: 1.6 RATIO
POCT-PROTHROMBIN TIME: 19.6 SECS
QUALITY CONTROL: YES

## 2023-11-03 PROCEDURE — 85610 PROTHROMBIN TIME: CPT

## 2023-11-03 PROCEDURE — 36416 COLLJ CAPILLARY BLOOD SPEC: CPT

## 2023-11-03 PROCEDURE — 99211 OFF/OP EST MAY X REQ PHY/QHP: CPT

## 2023-11-04 DIAGNOSIS — I82.91 CHRONIC EMBOLISM AND THROMBOSIS OF UNSPECIFIED VEIN: ICD-10-CM

## 2023-11-04 DIAGNOSIS — Z79.01 LONG TERM (CURRENT) USE OF ANTICOAGULANTS: ICD-10-CM

## 2023-11-08 ENCOUNTER — OUTPATIENT (OUTPATIENT)
Dept: OUTPATIENT SERVICES | Facility: HOSPITAL | Age: 83
LOS: 1 days | End: 2023-11-08
Payer: MEDICARE

## 2023-11-08 ENCOUNTER — APPOINTMENT (OUTPATIENT)
Dept: MEDICATION MANAGEMENT | Facility: CLINIC | Age: 83
End: 2023-11-08

## 2023-11-08 DIAGNOSIS — I48.91 UNSPECIFIED ATRIAL FIBRILLATION: ICD-10-CM

## 2023-11-08 DIAGNOSIS — Z79.01 LONG TERM (CURRENT) USE OF ANTICOAGULANTS: ICD-10-CM

## 2023-11-08 LAB
INR PPP: 2.7 RATIO
POCT-PROTHROMBIN TIME: 31.8 SECS
QUALITY CONTROL: YES

## 2023-11-08 PROCEDURE — 99211 OFF/OP EST MAY X REQ PHY/QHP: CPT

## 2023-11-08 PROCEDURE — 36416 COLLJ CAPILLARY BLOOD SPEC: CPT

## 2023-11-08 PROCEDURE — 85610 PROTHROMBIN TIME: CPT

## 2023-11-09 DIAGNOSIS — Z79.01 LONG TERM (CURRENT) USE OF ANTICOAGULANTS: ICD-10-CM

## 2023-11-09 DIAGNOSIS — I48.91 UNSPECIFIED ATRIAL FIBRILLATION: ICD-10-CM

## 2023-11-15 ENCOUNTER — APPOINTMENT (OUTPATIENT)
Dept: MEDICATION MANAGEMENT | Facility: CLINIC | Age: 83
End: 2023-11-15

## 2023-11-15 ENCOUNTER — OUTPATIENT (OUTPATIENT)
Dept: OUTPATIENT SERVICES | Facility: HOSPITAL | Age: 83
LOS: 1 days | End: 2023-11-15
Payer: MEDICARE

## 2023-11-15 DIAGNOSIS — Z79.01 LONG TERM (CURRENT) USE OF ANTICOAGULANTS: ICD-10-CM

## 2023-11-15 DIAGNOSIS — I82.91 CHRONIC EMBOLISM AND THROMBOSIS OF UNSPECIFIED VEIN: ICD-10-CM

## 2023-11-15 LAB
INR PPP: 1.6 RATIO
POCT-PROTHROMBIN TIME: 18.7 SECS
QUALITY CONTROL: YES

## 2023-11-15 PROCEDURE — 99211 OFF/OP EST MAY X REQ PHY/QHP: CPT

## 2023-11-15 PROCEDURE — 36416 COLLJ CAPILLARY BLOOD SPEC: CPT

## 2023-11-15 PROCEDURE — 85610 PROTHROMBIN TIME: CPT

## 2023-11-16 DIAGNOSIS — I82.91 CHRONIC EMBOLISM AND THROMBOSIS OF UNSPECIFIED VEIN: ICD-10-CM

## 2023-11-16 DIAGNOSIS — Z79.01 LONG TERM (CURRENT) USE OF ANTICOAGULANTS: ICD-10-CM

## 2023-11-22 ENCOUNTER — APPOINTMENT (OUTPATIENT)
Dept: MEDICATION MANAGEMENT | Facility: CLINIC | Age: 83
End: 2023-11-22

## 2023-11-22 ENCOUNTER — OUTPATIENT (OUTPATIENT)
Dept: OUTPATIENT SERVICES | Facility: HOSPITAL | Age: 83
LOS: 1 days | End: 2023-11-22
Payer: MEDICARE

## 2023-11-22 DIAGNOSIS — Z79.01 LONG TERM (CURRENT) USE OF ANTICOAGULANTS: ICD-10-CM

## 2023-11-22 DIAGNOSIS — I82.91 CHRONIC EMBOLISM AND THROMBOSIS OF UNSPECIFIED VEIN: ICD-10-CM

## 2023-11-22 PROCEDURE — 99211 OFF/OP EST MAY X REQ PHY/QHP: CPT

## 2023-11-22 PROCEDURE — 36416 COLLJ CAPILLARY BLOOD SPEC: CPT

## 2023-11-22 PROCEDURE — 85610 PROTHROMBIN TIME: CPT

## 2023-11-23 DIAGNOSIS — Z79.01 LONG TERM (CURRENT) USE OF ANTICOAGULANTS: ICD-10-CM

## 2023-11-23 DIAGNOSIS — I82.91 CHRONIC EMBOLISM AND THROMBOSIS OF UNSPECIFIED VEIN: ICD-10-CM

## 2023-11-24 LAB
INR PPP: 1.9 RATIO
POCT-PROTHROMBIN TIME: 22.3 SECS
QUALITY CONTROL: YES

## 2023-11-29 ENCOUNTER — OUTPATIENT (OUTPATIENT)
Dept: OUTPATIENT SERVICES | Facility: HOSPITAL | Age: 83
LOS: 1 days | End: 2023-11-29
Payer: MEDICARE

## 2023-11-29 ENCOUNTER — APPOINTMENT (OUTPATIENT)
Dept: MEDICATION MANAGEMENT | Facility: CLINIC | Age: 83
End: 2023-11-29

## 2023-11-29 DIAGNOSIS — Z79.01 LONG TERM (CURRENT) USE OF ANTICOAGULANTS: ICD-10-CM

## 2023-11-29 DIAGNOSIS — I82.91 CHRONIC EMBOLISM AND THROMBOSIS OF UNSPECIFIED VEIN: ICD-10-CM

## 2023-11-29 LAB
INR PPP: 2.9 RATIO
POCT-PROTHROMBIN TIME: 35.4 SECS
QUALITY CONTROL: YES

## 2023-11-29 PROCEDURE — 85610 PROTHROMBIN TIME: CPT

## 2023-11-29 PROCEDURE — 99211 OFF/OP EST MAY X REQ PHY/QHP: CPT

## 2023-11-29 PROCEDURE — 36416 COLLJ CAPILLARY BLOOD SPEC: CPT

## 2023-11-30 DIAGNOSIS — I82.91 CHRONIC EMBOLISM AND THROMBOSIS OF UNSPECIFIED VEIN: ICD-10-CM

## 2023-11-30 DIAGNOSIS — Z79.01 LONG TERM (CURRENT) USE OF ANTICOAGULANTS: ICD-10-CM

## 2023-12-06 ENCOUNTER — OUTPATIENT (OUTPATIENT)
Dept: OUTPATIENT SERVICES | Facility: HOSPITAL | Age: 83
LOS: 1 days | End: 2023-12-06
Payer: MEDICARE

## 2023-12-06 ENCOUNTER — APPOINTMENT (OUTPATIENT)
Dept: MEDICATION MANAGEMENT | Facility: CLINIC | Age: 83
End: 2023-12-06

## 2023-12-06 DIAGNOSIS — Z79.01 LONG TERM (CURRENT) USE OF ANTICOAGULANTS: ICD-10-CM

## 2023-12-06 DIAGNOSIS — I82.91 CHRONIC EMBOLISM AND THROMBOSIS OF UNSPECIFIED VEIN: ICD-10-CM

## 2023-12-06 PROCEDURE — 36416 COLLJ CAPILLARY BLOOD SPEC: CPT

## 2023-12-06 PROCEDURE — 85610 PROTHROMBIN TIME: CPT

## 2023-12-06 PROCEDURE — 99211 OFF/OP EST MAY X REQ PHY/QHP: CPT

## 2023-12-07 DIAGNOSIS — Z79.01 LONG TERM (CURRENT) USE OF ANTICOAGULANTS: ICD-10-CM

## 2023-12-07 DIAGNOSIS — I82.91 CHRONIC EMBOLISM AND THROMBOSIS OF UNSPECIFIED VEIN: ICD-10-CM

## 2023-12-08 LAB
INR PPP: 4.1 RATIO
POCT-PROTHROMBIN TIME: 48.9 SECS
QUALITY CONTROL: YES

## 2023-12-13 ENCOUNTER — APPOINTMENT (OUTPATIENT)
Dept: MEDICATION MANAGEMENT | Facility: CLINIC | Age: 83
End: 2023-12-13

## 2023-12-13 ENCOUNTER — OUTPATIENT (OUTPATIENT)
Dept: OUTPATIENT SERVICES | Facility: HOSPITAL | Age: 83
LOS: 1 days | End: 2023-12-13
Payer: MEDICARE

## 2023-12-13 DIAGNOSIS — I82.91 CHRONIC EMBOLISM AND THROMBOSIS OF UNSPECIFIED VEIN: ICD-10-CM

## 2023-12-13 DIAGNOSIS — Z79.01 LONG TERM (CURRENT) USE OF ANTICOAGULANTS: ICD-10-CM

## 2023-12-13 LAB
INR PPP: 1.7 RATIO
POCT-PROTHROMBIN TIME: 0.12 SECS
QUALITY CONTROL: YES

## 2023-12-13 PROCEDURE — 36416 COLLJ CAPILLARY BLOOD SPEC: CPT

## 2023-12-13 PROCEDURE — 85610 PROTHROMBIN TIME: CPT

## 2023-12-13 PROCEDURE — 99211 OFF/OP EST MAY X REQ PHY/QHP: CPT

## 2023-12-14 DIAGNOSIS — Z79.01 LONG TERM (CURRENT) USE OF ANTICOAGULANTS: ICD-10-CM

## 2023-12-14 DIAGNOSIS — I82.91 CHRONIC EMBOLISM AND THROMBOSIS OF UNSPECIFIED VEIN: ICD-10-CM

## 2023-12-20 ENCOUNTER — APPOINTMENT (OUTPATIENT)
Dept: MEDICATION MANAGEMENT | Facility: CLINIC | Age: 83
End: 2023-12-20

## 2023-12-20 ENCOUNTER — OUTPATIENT (OUTPATIENT)
Dept: OUTPATIENT SERVICES | Facility: HOSPITAL | Age: 83
LOS: 1 days | End: 2023-12-20
Payer: MEDICARE

## 2023-12-20 DIAGNOSIS — I82.91 CHRONIC EMBOLISM AND THROMBOSIS OF UNSPECIFIED VEIN: ICD-10-CM

## 2023-12-20 DIAGNOSIS — Z79.01 LONG TERM (CURRENT) USE OF ANTICOAGULANTS: ICD-10-CM

## 2023-12-20 LAB
INR PPP: 2.4 RATIO
POCT-PROTHROMBIN TIME: 29.2 SECS
QUALITY CONTROL: YES

## 2023-12-20 PROCEDURE — 99211 OFF/OP EST MAY X REQ PHY/QHP: CPT

## 2023-12-20 PROCEDURE — 85610 PROTHROMBIN TIME: CPT

## 2023-12-20 PROCEDURE — 36416 COLLJ CAPILLARY BLOOD SPEC: CPT

## 2023-12-21 DIAGNOSIS — I82.91 CHRONIC EMBOLISM AND THROMBOSIS OF UNSPECIFIED VEIN: ICD-10-CM

## 2023-12-21 DIAGNOSIS — Z79.01 LONG TERM (CURRENT) USE OF ANTICOAGULANTS: ICD-10-CM

## 2023-12-27 ENCOUNTER — APPOINTMENT (OUTPATIENT)
Dept: HEMATOLOGY ONCOLOGY | Facility: CLINIC | Age: 83
End: 2023-12-27
Payer: MEDICARE

## 2023-12-27 ENCOUNTER — APPOINTMENT (OUTPATIENT)
Dept: MEDICATION MANAGEMENT | Facility: CLINIC | Age: 83
End: 2023-12-27

## 2023-12-27 ENCOUNTER — OUTPATIENT (OUTPATIENT)
Dept: OUTPATIENT SERVICES | Facility: HOSPITAL | Age: 83
LOS: 1 days | End: 2023-12-27
Payer: MEDICARE

## 2023-12-27 DIAGNOSIS — Z63.4 DISAPPEARANCE AND DEATH OF FAMILY MEMBER: ICD-10-CM

## 2023-12-27 DIAGNOSIS — Z80.0 FAMILY HISTORY OF MALIGNANT NEOPLASM OF DIGESTIVE ORGANS: ICD-10-CM

## 2023-12-27 DIAGNOSIS — I82.91 CHRONIC EMBOLISM AND THROMBOSIS OF UNSPECIFIED VEIN: ICD-10-CM

## 2023-12-27 DIAGNOSIS — Z78.9 OTHER SPECIFIED HEALTH STATUS: ICD-10-CM

## 2023-12-27 DIAGNOSIS — F17.200 NICOTINE DEPENDENCE, UNSPECIFIED, UNCOMPLICATED: ICD-10-CM

## 2023-12-27 DIAGNOSIS — Z79.01 LONG TERM (CURRENT) USE OF ANTICOAGULANTS: ICD-10-CM

## 2023-12-27 DIAGNOSIS — C61 MALIGNANT NEOPLASM OF PROSTATE: ICD-10-CM

## 2023-12-27 DIAGNOSIS — Z87.891 PERSONAL HISTORY OF NICOTINE DEPENDENCE: ICD-10-CM

## 2023-12-27 LAB
INR PPP: 1.9 RATIO
POCT-PROTHROMBIN TIME: 24.1 SECS
QUALITY CONTROL: YES

## 2023-12-27 PROCEDURE — 85610 PROTHROMBIN TIME: CPT

## 2023-12-27 PROCEDURE — 99211 OFF/OP EST MAY X REQ PHY/QHP: CPT

## 2023-12-27 PROCEDURE — 99205 OFFICE O/P NEW HI 60 MIN: CPT

## 2023-12-27 PROCEDURE — 36416 COLLJ CAPILLARY BLOOD SPEC: CPT

## 2023-12-27 RX ORDER — ENZALUTAMIDE 40 MG/1
40 CAPSULE ORAL
Refills: 0 | Status: COMPLETED | COMMUNITY

## 2023-12-27 SDOH — SOCIAL STABILITY - SOCIAL INSECURITY: DISSAPEARANCE AND DEATH OF FAMILY MEMBER: Z63.4

## 2023-12-27 NOTE — HISTORY OF PRESENT ILLNESS
[N: ___] : N[unfilled] [AJCC Stage: ____] : AJCC Stage: [unfilled] [de-identified] : CC: I have prostate cancer  He is here at the request of Dr. Venkat Stockton  This is an 83-year-old male with a history of neuropathy, HTN, DM2,  afib,  prostate adenocarcinoma, for which she had a radical prostatectomy in 2004 complicated by mild stress incontinence.  He was noted to have rising PSA he underwent a PSMA PET/CT that demonstrated activity in his subcentimeter retroperitoneal and pelvic lymph nodes.   He received 6 months shot of Eligard in August 20 to 23 and also was started on Prolia. Nubequa was ordered but this was too expensive. Erleda and  Xtandi were also tried but they were expansive as well.   Nubequa was ordered again but insurance wanted to administer this with Taxotere.   They were able to obtain Xtandi  and he is on it now.   Review of blood work  PSA       date 1.82 March 2022 17.26        November 2022 22.82       December 2022 3.86         April 2023 13.84        August 2023 1.76         December 2023  Firmagon 1/10/2023 Eligard started 8/22/2023 He took Nubequa  for a month started 9/2023 He started Xtandi about one week ago  Blood work from December 18, 2023 CBC is fine, CMP is normal as well, PSA as mentioned 1.76  PSMA PET/CT scan July 28, 2022 PSMA avid subcentimeter retroperitoneal and pelvic lymph nodes highly suspicious for metastatic disease

## 2023-12-27 NOTE — ASSESSMENT
[Palliative] : Goals of care discussed with patient: Palliative [Patient/Caregiver not ready to engage] : Patient/Caregiver not ready to engage [FreeTextEntry1] : Impression This is an 83-year-old male with recurrent prostate cancer he was treated with prostatectomy in 2004 with PSMA PET showing metastatic disease in the retroperitoneal and pelvic lymph nodes in 2022.  Stage IV -Firmagon 1/10/2023 Eligard started 8/22/2023 He took Nubequa  for a month started 9/2023 He started Xtandi about one week ago  PSA  1.76 in 12/2023  He is here with his daughters and they informed me that Xtandi is costing him at this $600 a month and may be more starting January 2024  Plan -Given the financial toxicity I suggested we switch to abiratorone  250 mg with low-fat breakfast with prednisone 5 mg daily given that its generic and he will likely have the same benefit and they agreed -He will finish off his Xtandi since he has a months worth and then will switch approximately end of January -I went over side effects of  abiratorone in detail including but not limited to hypertension, electrolyte normalities, elevated liver enzymes, heart failure, but this is mainly when not taking with prednisone -He is reluctant to have another PSMA PET obtained that you ordered so we decided to hold off and just watch his PSA -Will have him see [facility] for genetic testing in the future we will also send foundation 1 liquid biopsy -He is on calcium and vitamin D and he will remain on Eligard with Prolia with you -He will see me back in the end of February by then he should be on belinda for approximately 1 month -he can go back to [facility] as well on abiraterone    [AdvancecareDate] : 12/27/2023

## 2023-12-27 NOTE — REASON FOR VISIT
[Initial Consultation] : an initial consultation [Family Member] : family member [FreeTextEntry2] : Prostate Cancer

## 2023-12-28 ENCOUNTER — NON-APPOINTMENT (OUTPATIENT)
Age: 83
End: 2023-12-28

## 2023-12-28 DIAGNOSIS — Z79.01 LONG TERM (CURRENT) USE OF ANTICOAGULANTS: ICD-10-CM

## 2023-12-28 DIAGNOSIS — I82.91 CHRONIC EMBOLISM AND THROMBOSIS OF UNSPECIFIED VEIN: ICD-10-CM

## 2023-12-28 DIAGNOSIS — C61 MALIGNANT NEOPLASM OF PROSTATE: ICD-10-CM

## 2024-01-03 ENCOUNTER — OUTPATIENT (OUTPATIENT)
Dept: OUTPATIENT SERVICES | Facility: HOSPITAL | Age: 84
LOS: 1 days | End: 2024-01-03
Payer: MEDICARE

## 2024-01-03 ENCOUNTER — APPOINTMENT (OUTPATIENT)
Dept: MEDICATION MANAGEMENT | Facility: CLINIC | Age: 84
End: 2024-01-03

## 2024-01-03 DIAGNOSIS — Z79.01 LONG TERM (CURRENT) USE OF ANTICOAGULANTS: ICD-10-CM

## 2024-01-03 DIAGNOSIS — I82.91 CHRONIC EMBOLISM AND THROMBOSIS OF UNSPECIFIED VEIN: ICD-10-CM

## 2024-01-03 LAB
INR PPP: 2.7 RATIO
POCT-PROTHROMBIN TIME: 33 SECS
QUALITY CONTROL: YES

## 2024-01-03 PROCEDURE — 36416 COLLJ CAPILLARY BLOOD SPEC: CPT

## 2024-01-03 PROCEDURE — 85610 PROTHROMBIN TIME: CPT

## 2024-01-03 PROCEDURE — 99211 OFF/OP EST MAY X REQ PHY/QHP: CPT

## 2024-01-03 RX ORDER — LOSARTAN POTASSIUM 100 MG/1
100 TABLET, FILM COATED ORAL DAILY
Refills: 0 | Status: ACTIVE | COMMUNITY

## 2024-01-03 RX ORDER — ERGOCALCIFEROL 1.25 MG/1
1.25 MG CAPSULE, LIQUID FILLED ORAL
Refills: 0 | Status: ACTIVE | COMMUNITY
Start: 2024-01-03

## 2024-01-03 RX ORDER — METFORMIN HYDROCHLORIDE 500 MG/1
500 TABLET, COATED ORAL TWICE DAILY
Refills: 0 | Status: ACTIVE | COMMUNITY

## 2024-01-03 RX ORDER — ALFUZOSIN HYDROCHLORIDE 10 MG/1
10 TABLET, EXTENDED RELEASE ORAL
Refills: 0 | Status: DISCONTINUED | COMMUNITY
End: 2024-01-03

## 2024-01-03 RX ORDER — GABAPENTIN 100 MG/1
CAPSULE ORAL
Refills: 0 | Status: DISCONTINUED | COMMUNITY
End: 2024-01-03

## 2024-01-03 RX ORDER — FUROSEMIDE 20 MG/1
20 TABLET ORAL
Refills: 0 | Status: ACTIVE | COMMUNITY

## 2024-01-03 RX ORDER — GABAPENTIN 600 MG/1
600 TABLET, COATED ORAL TWICE DAILY
Refills: 0 | Status: ACTIVE | COMMUNITY
Start: 2024-01-03

## 2024-01-03 RX ORDER — METOPROLOL SUCCINATE 50 MG/1
50 TABLET, EXTENDED RELEASE ORAL DAILY
Refills: 0 | Status: ACTIVE | COMMUNITY
Start: 2024-01-03

## 2024-01-03 RX ORDER — DULOXETINE HYDROCHLORIDE 60 MG/1
60 CAPSULE, DELAYED RELEASE PELLETS ORAL
Qty: 30 | Refills: 5 | Status: DISCONTINUED | COMMUNITY
Start: 2020-02-13 | End: 2024-01-03

## 2024-01-03 RX ORDER — FOLIC ACID 1 MG/1
1 TABLET ORAL DAILY
Refills: 0 | Status: ACTIVE | COMMUNITY
Start: 2024-01-03

## 2024-01-04 DIAGNOSIS — I82.91 CHRONIC EMBOLISM AND THROMBOSIS OF UNSPECIFIED VEIN: ICD-10-CM

## 2024-01-04 DIAGNOSIS — Z79.01 LONG TERM (CURRENT) USE OF ANTICOAGULANTS: ICD-10-CM

## 2024-01-10 ENCOUNTER — OUTPATIENT (OUTPATIENT)
Dept: OUTPATIENT SERVICES | Facility: HOSPITAL | Age: 84
LOS: 1 days | End: 2024-01-10
Payer: MEDICARE

## 2024-01-10 ENCOUNTER — APPOINTMENT (OUTPATIENT)
Dept: MEDICATION MANAGEMENT | Facility: CLINIC | Age: 84
End: 2024-01-10

## 2024-01-10 DIAGNOSIS — I82.91 CHRONIC EMBOLISM AND THROMBOSIS OF UNSPECIFIED VEIN: ICD-10-CM

## 2024-01-10 DIAGNOSIS — Z79.01 LONG TERM (CURRENT) USE OF ANTICOAGULANTS: ICD-10-CM

## 2024-01-10 PROCEDURE — 99211 OFF/OP EST MAY X REQ PHY/QHP: CPT

## 2024-01-10 PROCEDURE — 85610 PROTHROMBIN TIME: CPT

## 2024-01-10 PROCEDURE — 36416 COLLJ CAPILLARY BLOOD SPEC: CPT

## 2024-01-11 DIAGNOSIS — Z79.01 LONG TERM (CURRENT) USE OF ANTICOAGULANTS: ICD-10-CM

## 2024-01-11 DIAGNOSIS — I82.91 CHRONIC EMBOLISM AND THROMBOSIS OF UNSPECIFIED VEIN: ICD-10-CM

## 2024-01-12 LAB
INR PPP: 5.6 RATIO
POCT-PROTHROMBIN TIME: 67 SECS
QUALITY CONTROL: YES

## 2024-01-16 ENCOUNTER — NON-APPOINTMENT (OUTPATIENT)
Age: 84
End: 2024-01-16

## 2024-01-16 ENCOUNTER — APPOINTMENT (OUTPATIENT)
Dept: MEDICATION MANAGEMENT | Facility: CLINIC | Age: 84
End: 2024-01-16

## 2024-01-16 ENCOUNTER — OUTPATIENT (OUTPATIENT)
Dept: OUTPATIENT SERVICES | Facility: HOSPITAL | Age: 84
LOS: 1 days | End: 2024-01-16
Payer: MEDICARE

## 2024-01-16 ENCOUNTER — APPOINTMENT (OUTPATIENT)
Dept: HEMATOLOGY ONCOLOGY | Facility: CLINIC | Age: 84
End: 2024-01-16

## 2024-01-16 DIAGNOSIS — Z79.01 LONG TERM (CURRENT) USE OF ANTICOAGULANTS: ICD-10-CM

## 2024-01-16 DIAGNOSIS — I82.91 CHRONIC EMBOLISM AND THROMBOSIS OF UNSPECIFIED VEIN: ICD-10-CM

## 2024-01-16 LAB
INR PPP: 1.8 RATIO
POCT-PROTHROMBIN TIME: 21.2 SECS
QUALITY CONTROL: YES

## 2024-01-16 PROCEDURE — 36416 COLLJ CAPILLARY BLOOD SPEC: CPT

## 2024-01-16 PROCEDURE — 85610 PROTHROMBIN TIME: CPT

## 2024-01-16 PROCEDURE — 99211 OFF/OP EST MAY X REQ PHY/QHP: CPT

## 2024-01-17 ENCOUNTER — APPOINTMENT (OUTPATIENT)
Dept: MEDICATION MANAGEMENT | Facility: CLINIC | Age: 84
End: 2024-01-17

## 2024-01-17 DIAGNOSIS — Z79.01 LONG TERM (CURRENT) USE OF ANTICOAGULANTS: ICD-10-CM

## 2024-01-17 DIAGNOSIS — I82.91 CHRONIC EMBOLISM AND THROMBOSIS OF UNSPECIFIED VEIN: ICD-10-CM

## 2024-01-17 NOTE — DISCUSSION/SUMMARY
[FreeTextEntry1] : REASON FOR VISIT: Mr. Braulio Cordoba is a 83-year-old male who was referred by Dr. Kareem Marsh for cancer genetic counseling and risk assessment due to a personal history of prostate cancer. The patient was seen on 1/17/2024 through the [facility]. The patient was accompanied by his daughter, Thelma.  RELEVANT MEDICAL AND SURGICAL HISTORY: The patient has a personal history of prostate cancer diagnosed 20y ago. He was treated with prostatectomy in 2004. In 2022 PSMA PET showed metastatic disease in the retroperitoneal and pelvic lymph nodes. He was on Xtandi and is switching to abiraterone.   OTHER MEDICAL AND SURGICAL HISTORY: - Basal cell carcinoma removed from right ear and from nose - Neuropathy   SOCIAL HISTORY:  Tobacco-product use: Current smoker, quit for 24 years and started up again a few years ago   FAMILY HISTORY: Paternal ancestry was reported as Moroccan/Pakistani and maternal ancestry was reported as Moroccan/Pakistani. A detailed family history of cancer was ascertained, see below for pedigree. Of note: - Father with throat cancer -  Paternal aunt with unknown cancer    The remaining family history is unremarkable. According to the patient there are no other known cases of significant cancers in the family. To his knowledge no one else in the family has had germline testing for cancer susceptibility.   RISK ASSESSMENT: The patient's personal and family history of cancer may be suggestive of a hereditary cancer susceptibility syndrome. Variants in prostate cancer susceptibility genes were of specific concern.   We recommended genetic testing for a prostate cancer panel. This test analyzes 12 genes: GUSTAVO, BRCA1, BRCA2, CHEK2, EPCAM, HOXB13, MLH1, MSH2, MSH6, PALB2, PMS2, TP53   We discussed the risks, benefits and limitations, financial cost and implications of genetic testing. We also discussed the psychosocial implications of genetic testing. Possible test results were reviewed with the patient, along with associated medical management options. The Genetic Information Non-discrimination Act ([place]) was also reviewed.   The patient consented to the above-mentioned genetic testing panel. A sample was obtained from the patient in our clinic and will be sent to [facility] for analysis.   PLAN: 1. The patient's sample will be sent to [facility] for analysis. 2. We will contact the patient once the results are available. Results generally return in 2-3 weeks.   For any additional questions please call Cancer Genetics at [phone].     Shereen Márquez MS, [facility] [place] Genetic Counselor, Cancer Genetics

## 2024-01-24 ENCOUNTER — APPOINTMENT (OUTPATIENT)
Dept: MEDICATION MANAGEMENT | Facility: CLINIC | Age: 84
End: 2024-01-24

## 2024-01-25 ENCOUNTER — NON-APPOINTMENT (OUTPATIENT)
Age: 84
End: 2024-01-25

## 2024-01-30 NOTE — DISCUSSION/SUMMARY
[FreeTextEntry1] : REASON FOR VISIT: Mr. Braulio Cordoba is a 83-year-old male who was called on 1/25/2024 for a discussion regarding his negative genetic test results related to hereditary cancer predisposition. The patient did not answer the phone and a voicemail was unable to be left. His daughter was able to be reached on 1/30/24 and we reviewed the results.   RELEVANT MEDICAL AND SURGICAL HISTORY: The patient has a personal history of prostate cancer diagnosed 20y ago. He was treated with prostatectomy in 2004. In 2022 PSMA PET showed metastatic disease in the retroperitoneal and pelvic lymph nodes. He was on Xtandi and is switching to abiraterone.  OTHER MEDICAL AND SURGICAL HISTORY: - Basal cell carcinoma removed from right ear and from nose - Neuropathy  SOCIAL HISTORY:  Tobacco-product use: Current smoker, quit for 24 years and started up again a few years ago  FAMILY HISTORY: Paternal ancestry was reported as Leanne/Greek and maternal ancestry was reported as Leanne/Greek. A detailed family history of cancer was ascertained, see below for pedigree. Of note: - Father with throat cancer - Paternal aunt with unknown cancer  The remaining family history is unremarkable. According to the patient there are no other known cases of significant cancers in the family. To his knowledge no one else in the family has had germline testing for cancer susceptibility.   TEST RESULTS: NEGATIVE   NO pathogenic (disease-causing) variants or variants of uncertain significance were detected in the following genes: GUSTAVO, BRCA1, BRCA2, CHEK2, EPCAM, HOXB13, MLH1, MSH2, MSH6, PALB2, PMS2, TP53   RESULTS INTERPRETATION AND ASSESSMENT: Given Mr. Cordoba's current reported personal and family history of cancer, and his negative genetic test results, the following screening guidelines and risk-reducing recommendations were discussed:   Prostate and Skin: Long-term management and surveillance should be based on the patients on- or post-treatment protocol as recommended by his surgeons and/or oncologist.   Other: In the absence of other indications, the patient should practice age-appropriate cancer screening for all other organ systems as recommended for the general population.   It is recommended that the patient discuss the importance of pursuing cancer genetic testing and counseling with his other relatives. There may be a pathogenic variant in the family which the patient did not inherit. We would be happy to meet with his family members or refer them to a genetic expert in their area.   We also discussed that, while no clear cause of the patients personal and family history of cancer was identified, this result, while reassuring, does entirely not rule out a hereditary cancer risk in the patient. It is possible the patient has a mutation in one of the genes tested that is not detectable by this analysis, or has a mutation in a different gene, either known or unknown. It is also possible there is a hereditary cancer predisposition in the family, but the patient did not inherit it.   We informed the patient that our knowledge of genetics and inherited cancer conditions is changing rapidly. Therefore, we recommended that the patient contact our office, every 2 to 3 years, to discuss relevant advances in cancer genetics. We emphasized the importance of re-contacting us with updates regarding his personal and family history of cancer as well as any updates regarding additional cancer genetic test results performed for the patient and/or family members.  Such updates could possibly change our risk assessment and recommendations.   PLAN: 1. Long-term management and surveillance should be based on the patients personal and family history and general population guidelines for all other cancers. 2. A copy of the genetic test results is available to the patient in the [facility] [employer] portal. 3. The patient was encouraged to contact us every 2-3 years to discuss relevant advances in cancer genetics, or sooner if there are any changes in his personal or family history of cancer.   For any additional questions please call Cancer Genetics at [phone] or [phone].     Shereen Márquez MS, [facility] [place] Genetic Counselor, Cancer Genetics

## 2024-02-07 ENCOUNTER — LABORATORY RESULT (OUTPATIENT)
Age: 84
End: 2024-02-07

## 2024-02-07 ENCOUNTER — APPOINTMENT (OUTPATIENT)
Dept: HEMATOLOGY ONCOLOGY | Facility: CLINIC | Age: 84
End: 2024-02-07
Payer: MEDICARE

## 2024-02-07 ENCOUNTER — OUTPATIENT (OUTPATIENT)
Dept: OUTPATIENT SERVICES | Facility: HOSPITAL | Age: 84
LOS: 1 days | End: 2024-02-07
Payer: MEDICARE

## 2024-02-07 VITALS
SYSTOLIC BLOOD PRESSURE: 117 MMHG | WEIGHT: 211 LBS | DIASTOLIC BLOOD PRESSURE: 72 MMHG | HEIGHT: 72 IN | HEART RATE: 72 BPM | BODY MASS INDEX: 28.58 KG/M2

## 2024-02-07 DIAGNOSIS — D64.9 ANEMIA, UNSPECIFIED: ICD-10-CM

## 2024-02-07 LAB
HCT VFR BLD CALC: 34.4 %
HGB BLD-MCNC: 12.1 G/DL
MCHC RBC-ENTMCNC: 33.1 PG
MCHC RBC-ENTMCNC: 35.2 G/DL
MCV RBC AUTO: 94 FL
PLATELET # BLD AUTO: 178 K/UL
PMV BLD: 10.3 FL
RBC # BLD: 3.66 M/UL
RBC # FLD: 13.4 %
WBC # FLD AUTO: 6.88 K/UL

## 2024-02-07 PROCEDURE — 99214 OFFICE O/P EST MOD 30 MIN: CPT

## 2024-02-07 PROCEDURE — 85027 COMPLETE CBC AUTOMATED: CPT

## 2024-02-07 PROCEDURE — 84403 ASSAY OF TOTAL TESTOSTERONE: CPT

## 2024-02-07 PROCEDURE — 80053 COMPREHEN METABOLIC PANEL: CPT

## 2024-02-07 PROCEDURE — 84154 ASSAY OF PSA FREE: CPT

## 2024-02-07 RX ORDER — WARFARIN 2.5 MG/1
2.5 TABLET ORAL
Qty: 60 | Refills: 5 | Status: COMPLETED | COMMUNITY
Start: 2023-10-05 | End: 2024-02-07

## 2024-02-07 RX ORDER — WARFARIN 2.5 MG/1
2.5 TABLET ORAL
Qty: 180 | Refills: 3 | Status: COMPLETED | COMMUNITY
Start: 2023-12-27 | End: 2024-02-07

## 2024-02-08 DIAGNOSIS — D64.9 ANEMIA, UNSPECIFIED: ICD-10-CM

## 2024-02-08 LAB
ALBUMIN SERPL ELPH-MCNC: 4 G/DL
ALP BLD-CCNC: 64 U/L
ALT SERPL-CCNC: 9 U/L
ANION GAP SERPL CALC-SCNC: 11 MMOL/L
AST SERPL-CCNC: 13 U/L
BILIRUB SERPL-MCNC: 0.6 MG/DL
BUN SERPL-MCNC: 19 MG/DL
CALCIUM SERPL-MCNC: 9.7 MG/DL
CHLORIDE SERPL-SCNC: 103 MMOL/L
CO2 SERPL-SCNC: 26 MMOL/L
CREAT SERPL-MCNC: 0.9 MG/DL
EGFR: 85 ML/MIN/1.73M2
GLUCOSE SERPL-MCNC: 93 MG/DL
POTASSIUM SERPL-SCNC: 4.6 MMOL/L
PROT SERPL-MCNC: 6.9 G/DL
PSA FREE SERPL-MCNC: 0.61 NG/ML
SODIUM SERPL-SCNC: 140 MMOL/L
TESTOST SERPL-MCNC: <2.5 NG/DL

## 2024-02-14 NOTE — ASSESSMENT
[Palliative] : Goals of care discussed with patient: Palliative [Patient/Caregiver not ready to engage] : Patient/Caregiver not ready to engage [FreeTextEntry1] : # recurrent prostate cancer. - 2004 radical prostatectomy complicated by mild stress incontinence.  - 2022 PSMA PET due to rising PSA - showing metastatic disease in the retroperitoneal and pelvic subcentimeter lymph nodes - Stage IV. - 01/10/2023 started Firmagon. - 02/14/2023 started Eligard 45 mg IM Q6M by Urology. - 08/20/2023 started Prolia. - 11/16/2023 ordered Nubequa for a month - but was too expensive as well as Erleda. - 12/27/2023 trial of Xtandi for 1 month - is costing him at this $600 a month and may be more starting January 2024. - 12/27/2023 PSA 1.76. - Nubequa was ordered again but insurance wanted to administer this with Taxotere.  - He will finish off his Xtandi since he has a month worth and then will switch to abiraterone. - 01/22/2024 started abiraterone 250 mg PO QD with prednisone 5 mg PO BID.  # PSA 03/2022 1.82 11/2022 17.26 12/2022 22.82 04/2023 3.86 08/2023 13.84 12/2023 1.76  # peripheral neuropathy - chronic, diagnosed a long time ago.  # smoking about 1 pack per day - declined smoking cessation.  02/07/2024 Labs reviewed and results discussed with the patient and his daughters - remains clinically stable to continue current management. All questions were answered to satisfaction.  PLAN: - continue abiraterone 250 mg with low-fat breakfast with prednisone 5 mg daily. - continue Eligard Q6M and Prolia with urology. - continue Eliquis 2.5 mg Q12H. - continue Folic Acid 1 mg PO QD. - continue Calcium PO Q12H. - continue Vit.D 500K PO QW. - He is reluctant to have another PSMA PET obtained, so we decided to hold off and just watch his PSA. - he was strongly advised to quit drinking. - declined smoking cessation - will encourage. - Labs today: CBC CMP PSA testosterone. RTC in 3 months with CBC CMP PSA testosterone. [AdvancecareDate] : 12/27/2023

## 2024-02-14 NOTE — END OF VISIT
[FreeTextEntry3] : I was physically present for the key portions of the evaluation and management service provided.  I agree with the history and physical, and plan which I have reviewed and edited where appropriate.  Patient returns for follow-up today.  He has recurrent prostate cancer with lymph node metastases and currently on abiraterone with ADT.  He is doing well.  Will check blood work today including PSA if PSA continues to decrease will see us back in 3 months we will possibly obtain repeat imaging in the future to gauge progress versus imaging only on PSA increasing and or new symptoms

## 2024-02-14 NOTE — PHYSICAL EXAM
[Fully active, able to carry on all pre-disease performance without restriction] : Status 0 - Fully active, able to carry on all pre-disease performance without restriction [Normal] : affect appropriate [de-identified] : b/l hearing aids

## 2024-02-14 NOTE — HISTORY OF PRESENT ILLNESS
[N: ___] : N[unfilled] [AJCC Stage: ____] : AJCC Stage: [unfilled] [de-identified] : CC: I have prostate cancer  He is here at the request of Dr. Venkat Stockton  This is an 83-year-old male with a history of neuropathy, HTN, DM2, afib, prostate adenocarcinoma, for which she had a radical prostatectomy in 2004 complicated by mild stress incontinence. He was noted to have rising PSA he underwent a PSMA PET/CT that demonstrated activity in his subcentimeter retroperitoneal and pelvic lymph nodes. He received 6 months shot of Eligard in August 20 to 23 and also was started on Prolia. Nubequa was ordered but this was too expensive. Erleda and Xtandi were also tried but they were expansive as well. Nubequa was ordered again but insurance wanted to administer this with Taxotere.   They were able to obtain Xtandi and he is on it now.  Review of blood work  PSA       date 1.82 March 2022 17.26        November 2022 22.82       December 2022 3.86         April 2023 13.84        August 2023 1.76         December 2023  Firmagon 1/10/2023 Eligard started 8/22/2023 He took Nubequa for a month started 9/2023 He started Xtandi about one week ago  Blood work from December 18, 2023 CBC is fine, CMP is normal as well, PSA as mentioned 1.76  PSMA PET/CT scan July 28, 2022 PSMA avid subcentimeter retroperitoneal and pelvic lymph nodes highly suspicious for metastatic disease [de-identified] : 02/07/2024 accompanied by his two daughters; Reports feeling well, except for neuropathy "pins and needles" at b/l below knees; no changes in chronic conditions or new complaints since the last visit. He still smokes 1 pack per day and consumes "vodka with club Soda "not much" three time per week - he is not interested to quit at this time.

## 2024-05-09 ENCOUNTER — APPOINTMENT (OUTPATIENT)
Age: 84
End: 2024-05-09
Payer: MEDICARE

## 2024-05-09 ENCOUNTER — LABORATORY RESULT (OUTPATIENT)
Age: 84
End: 2024-05-09

## 2024-05-09 LAB
ALBUMIN SERPL ELPH-MCNC: 4.1 G/DL
ALP BLD-CCNC: 78 U/L
ALT SERPL-CCNC: 9 U/L
ANION GAP SERPL CALC-SCNC: 17 MMOL/L
AST SERPL-CCNC: 14 U/L
BILIRUB SERPL-MCNC: 0.6 MG/DL
BUN SERPL-MCNC: 11 MG/DL
CALCIUM SERPL-MCNC: 9.2 MG/DL
CHLORIDE SERPL-SCNC: 104 MMOL/L
CO2 SERPL-SCNC: 21 MMOL/L
CREAT SERPL-MCNC: 0.7 MG/DL
EGFR: 91 ML/MIN/1.73M2
GLUCOSE SERPL-MCNC: 157 MG/DL
HCT VFR BLD CALC: 35.9 %
HGB BLD-MCNC: 12.1 G/DL
MCHC RBC-ENTMCNC: 30.3 PG
MCHC RBC-ENTMCNC: 33.7 G/DL
MCV RBC AUTO: 90 FL
PLATELET # BLD AUTO: 171 K/UL
PMV BLD: 9.8 FL
POTASSIUM SERPL-SCNC: 3.7 MMOL/L
PROT SERPL-MCNC: 6.9 G/DL
RBC # BLD: 3.99 M/UL
RBC # FLD: 14.5 %
SODIUM SERPL-SCNC: 142 MMOL/L
WBC # FLD AUTO: 6.45 K/UL

## 2024-05-09 PROCEDURE — G2211 COMPLEX E/M VISIT ADD ON: CPT

## 2024-05-09 PROCEDURE — 99214 OFFICE O/P EST MOD 30 MIN: CPT

## 2024-05-09 RX ORDER — ABIRATERONE ACETATE 250 MG/1
250 TABLET, FILM COATED ORAL DAILY
Qty: 30 | Refills: 5 | Status: ACTIVE | COMMUNITY
Start: 2023-12-27 | End: 1900-01-01

## 2024-05-09 RX ORDER — APIXABAN 2.5 MG/1
2.5 TABLET, FILM COATED ORAL
Qty: 180 | Refills: 3 | Status: ACTIVE | COMMUNITY
Start: 2024-01-16 | End: 1900-01-01

## 2024-05-09 RX ORDER — PREDNISONE 5 MG/1
5 TABLET ORAL DAILY
Qty: 30 | Refills: 5 | Status: ACTIVE | COMMUNITY
Start: 2023-12-27 | End: 1900-01-01

## 2024-05-10 LAB
PSA SERPL-MCNC: 1.46 NG/ML
TESTOST SERPL-MCNC: <2.5 NG/DL

## 2024-05-17 NOTE — HISTORY OF PRESENT ILLNESS
[N: ___] : N[unfilled] [AJCC Stage: ____] : AJCC Stage: [unfilled] [de-identified] : CC: I have prostate cancer  He is here at the request of Dr. Venkat Stockton  This is an 83-year-old male with a history of neuropathy, HTN, DM2, afib, prostate adenocarcinoma, for which she had a radical prostatectomy in 2004 complicated by mild stress incontinence. He was noted to have rising PSA he underwent a PSMA PET/CT that demonstrated activity in his subcentimeter retroperitoneal and pelvic lymph nodes. He received 6 months shot of Eligard in August 20 to 23 and also was started on Prolia. Nubequa was ordered but this was too expensive. Erleda and Xtandi were also tried but they were expansive as well. Nubequa was ordered again but insurance wanted to administer this with Taxotere.   They were able to obtain Xtandi and he is on it now.  Review of blood work  PSA       date 1.82 March 2022 17.26        November 2022 22.82       December 2022 3.86         April 2023 13.84        August 2023 1.76         December 2023  Firmagon 1/10/2023 Eligard started 8/22/2023 He took Nubequa for a month started 9/2023 He started Xtandi about one week ago  Blood work from December 18, 2023 CBC is fine, CMP is normal as well, PSA as mentioned 1.76  PSMA PET/CT scan July 28, 2022 PSMA avid subcentimeter retroperitoneal and pelvic lymph nodes highly suspicious for metastatic disease [de-identified] : 02/07/2024 accompanied by his two daughters; Reports feeling well, except for neuropathy "pins and needles" at b/l below knees; no changes in chronic conditions or new complaints since the last visit. He still smokes 1 pack per day and consumes "vodka with club Soda "not much" three time per week - he is not interested to quit at this time.  05/09/2024 accompanied by his two daughter; Reports increasing depression.

## 2024-05-17 NOTE — PHYSICAL EXAM
[Fully active, able to carry on all pre-disease performance without restriction] : Status 0 - Fully active, able to carry on all pre-disease performance without restriction [Normal] : normal spine exam without palpable tenderness, no kyphosis or scoliosis [de-identified] : b/l hearing aids

## 2024-05-17 NOTE — END OF VISIT
[FreeTextEntry3] : I was physically present for the key portions of the evaluation and management service provided.  I agree with the history and physical, and plan which I have reviewed and edited where appropriate.  He is here for follow up. He is on ADT and Rosa M for his prsotate cancer. Doing well. PSA is 1.46 from this visit. Imaging if PSA is rising and or symptoms if he agrees.

## 2024-05-17 NOTE — ASSESSMENT
[Palliative] : Goals of care discussed with patient: Palliative [Patient/Caregiver not ready to engage] : Patient/Caregiver not ready to engage [FreeTextEntry1] : # recurrent prostate cancer. - 2004 radical prostatectomy complicated by mild stress incontinence.  - 2022 PSMA PET due to rising PSA - showing metastatic disease in the retroperitoneal and pelvic subcentimeter lymph nodes - Stage IV. - 01/10/2023 started Firmagon. - 02/14/2023 started Eligard 45 mg IM Q6M by Urology. - 08/20/2023 started Prolia. - 11/16/2023 ordered Nubequa for a month - but was too expensive as well as Erleda. - 12/27/2023 trial of Xtandi for 1 month - is costing him at this $600 a month and may be more starting January 2024. - 12/27/2023 PSA 1.76. - Nubequa was ordered again but insurance wanted to administer this with Taxotere.  - He will finish off his Xtandi since he has a month worth and then will switch to abiraterone. - 01/22/2024 started abiraterone 250 mg PO QD with prednisone 5 mg PO BID.  # PSA 03/2022 1.82 11/2022 17.26 12/2022 22.82 04/2023 3.86 08/2023 13.84 12/2023 1.76 02/08/2024 PSA 0.61  # peripheral neuropathy - chronic, diagnosed a long time ago.  # smoking about 1 pack per day - declined smoking cessation.  05/09/2024 Labs reviewed and results discussed with the patient and his two daughters - although there is a report of increasing depression, he still remains clinically stable to continue current management. I recommended to consult psychiatry, but the patient decline; All questions were answered to satisfaction.  PLAN: - continue abiraterone 250 mg with low-fat breakfast with prednisone 5 mg daily. - continue Eligard Q6M and Prolia with urology. - continue Eliquis 2.5 mg Q12H. - continue Folic Acid 1 mg PO QD. - continue Calcium PO Q12H. - continue Vit.D 500K PO QW. - He is still reluctant to have another PSMA PET obtained, so we decided to hold off and just continue to monitor his PSA. - he was strongly advised to quit drinking, but decided not to adhere. - declined smoking cessation - will encourage. - Labs today: CBC CMP PSA testosterone. RTC in 3 months with CBC CMP PSA testosterone. [AdvancecareDate] : 12/27/2023

## 2024-05-23 ENCOUNTER — NON-APPOINTMENT (OUTPATIENT)
Age: 84
End: 2024-05-23

## 2024-06-05 ENCOUNTER — NON-APPOINTMENT (OUTPATIENT)
Age: 84
End: 2024-06-05

## 2024-06-20 ENCOUNTER — APPOINTMENT (OUTPATIENT)
Age: 84
End: 2024-06-20

## 2024-06-20 ENCOUNTER — OUTPATIENT (OUTPATIENT)
Dept: OUTPATIENT SERVICES | Facility: HOSPITAL | Age: 84
LOS: 1 days | End: 2024-06-20
Payer: COMMERCIAL

## 2024-06-20 ENCOUNTER — LABORATORY RESULT (OUTPATIENT)
Age: 84
End: 2024-06-20

## 2024-06-20 VITALS
SYSTOLIC BLOOD PRESSURE: 148 MMHG | BODY MASS INDEX: 26.49 KG/M2 | TEMPERATURE: 98.2 F | DIASTOLIC BLOOD PRESSURE: 78 MMHG | WEIGHT: 195.6 LBS | OXYGEN SATURATION: 98 % | HEART RATE: 60 BPM | HEIGHT: 72 IN | RESPIRATION RATE: 17 BRPM

## 2024-06-20 DIAGNOSIS — Z51.12 ENCOUNTER FOR ANTINEOPLASTIC CHEMOTHERAPY: ICD-10-CM

## 2024-06-20 DIAGNOSIS — C61 MALIGNANT NEOPLASM OF PROSTATE: ICD-10-CM

## 2024-06-20 DIAGNOSIS — Z51.11 ENCOUNTER FOR ANTINEOPLASTIC CHEMOTHERAPY: ICD-10-CM

## 2024-06-20 LAB
ALBUMIN SERPL ELPH-MCNC: 4.2 G/DL
ALP BLD-CCNC: 96 U/L
ALT SERPL-CCNC: 20 U/L
ANION GAP SERPL CALC-SCNC: 16 MMOL/L
AST SERPL-CCNC: 28 U/L
BILIRUB SERPL-MCNC: 1.1 MG/DL
BUN SERPL-MCNC: 10 MG/DL
CALCIUM SERPL-MCNC: 9.5 MG/DL
CHLORIDE SERPL-SCNC: 101 MMOL/L
CO2 SERPL-SCNC: 24 MMOL/L
CREAT SERPL-MCNC: 0.7 MG/DL
EGFR: 91 ML/MIN/1.73M2
GLUCOSE SERPL-MCNC: 105 MG/DL
HCT VFR BLD CALC: 37.5 %
HGB BLD-MCNC: 13.2 G/DL
MCHC RBC-ENTMCNC: 30.6 PG
MCHC RBC-ENTMCNC: 35.2 G/DL
MCV RBC AUTO: 86.8 FL
PLATELET # BLD AUTO: 165 K/UL
PMV BLD: 10.1 FL
POTASSIUM SERPL-SCNC: 3.9 MMOL/L
PROT SERPL-MCNC: 6.7 G/DL
RBC # BLD: 4.32 M/UL
RBC # FLD: 15.6 %
SODIUM SERPL-SCNC: 141 MMOL/L
WBC # FLD AUTO: 7.12 K/UL

## 2024-06-20 PROCEDURE — G2211 COMPLEX E/M VISIT ADD ON: CPT | Mod: NC

## 2024-06-20 PROCEDURE — 84153 ASSAY OF PSA TOTAL: CPT

## 2024-06-20 PROCEDURE — 99215 OFFICE O/P EST HI 40 MIN: CPT

## 2024-06-20 PROCEDURE — 80053 COMPREHEN METABOLIC PANEL: CPT

## 2024-06-20 PROCEDURE — 85027 COMPLETE CBC AUTOMATED: CPT

## 2024-06-20 PROCEDURE — 84403 ASSAY OF TOTAL TESTOSTERONE: CPT

## 2024-06-20 PROCEDURE — 84402 ASSAY OF FREE TESTOSTERONE: CPT

## 2024-06-21 DIAGNOSIS — C61 MALIGNANT NEOPLASM OF PROSTATE: ICD-10-CM

## 2024-06-21 LAB
PSA SERPL-MCNC: 1.87 NG/ML
TESTOST FREE SERPL-MCNC: 0 PG/ML
TESTOST SERPL-MCNC: <2.5 NG/DL

## 2024-06-25 ENCOUNTER — NON-APPOINTMENT (OUTPATIENT)
Age: 84
End: 2024-06-25

## 2024-06-26 ENCOUNTER — NON-APPOINTMENT (OUTPATIENT)
Age: 84
End: 2024-06-26

## 2024-06-28 ENCOUNTER — OUTPATIENT (OUTPATIENT)
Dept: OUTPATIENT SERVICES | Facility: HOSPITAL | Age: 84
LOS: 1 days | End: 2024-06-28
Payer: MEDICARE

## 2024-06-28 ENCOUNTER — APPOINTMENT (OUTPATIENT)
Dept: RADIATION ONCOLOGY | Facility: HOSPITAL | Age: 84
End: 2024-06-28
Payer: MEDICARE

## 2024-06-28 VITALS
OXYGEN SATURATION: 99 % | HEART RATE: 81 BPM | BODY MASS INDEX: 26.56 KG/M2 | SYSTOLIC BLOOD PRESSURE: 171 MMHG | RESPIRATION RATE: 16 BRPM | TEMPERATURE: 97.8 F | WEIGHT: 196.13 LBS | HEIGHT: 72 IN | DIASTOLIC BLOOD PRESSURE: 81 MMHG

## 2024-06-28 DIAGNOSIS — C61 MALIGNANT NEOPLASM OF PROSTATE: ICD-10-CM

## 2024-06-28 PROCEDURE — 99204 OFFICE O/P NEW MOD 45 MIN: CPT

## 2024-06-29 NOTE — HISTORY OF PRESENT ILLNESS
[N: ___] : N[unfilled] [AJCC Stage: ____] : AJCC Stage: [unfilled] [de-identified] : CC: I have prostate cancer  He is here at the request of Dr. Venkat Stockton  This is an 83-year-old male with a history of neuropathy, HTN, DM2, afib, prostate adenocarcinoma, for which she had a radical prostatectomy in 2004 complicated by mild stress incontinence. He was noted to have rising PSA he underwent a PSMA PET/CT that demonstrated activity in his subcentimeter retroperitoneal and pelvic lymph nodes. He received 6 months shot of Eligard in August 20 to 23 and also was started on Prolia. Nubequa was ordered but this was too expensive. Erleda and Xtandi were also tried but they were expansive as well. Nubequa was ordered again but insurance wanted to administer this with Taxotere.   They were able to obtain Xtandi and he is on it now.  Review of blood work  PSA       date 1.82 March 2022 17.26        November 2022 22.82       December 2022 3.86         April 2023 13.84        August 2023 1.76         December 2023  Firmagon 1/10/2023 Eligard started 8/22/2023 He took Nubequa for a month started 9/2023 He started Xtandi about one week ago  Blood work from December 18, 2023 CBC is fine, CMP is normal as well, PSA as mentioned 1.76  PSMA PET/CT scan July 28, 2022 PSMA avid subcentimeter retroperitoneal and pelvic lymph nodes highly suspicious for metastatic disease [de-identified] : 02/07/2024 accompanied by his two daughters; Reports feeling well, except for neuropathy "pins and needles" at b/l below knees; no changes in chronic conditions or new complaints since the last visit. He still smokes 1 pack per day and consumes "vodka with club Soda "not much" three time per week - he is not interested to quit at this time.  05/09/2024 accompanied by his two daughters; Reports increasing depression.  06/20/2024 accompanied by his two daughters; Reports feeling well at the baseline of his health status, no changes in chronic conditions except for increasing back pains S/P his last fall in past.

## 2024-06-29 NOTE — ASSESSMENT
[Palliative] : Goals of care discussed with patient: Palliative [Patient/Caregiver not ready to engage] : Patient/Caregiver not ready to engage [FreeTextEntry1] : # recurrent prostate cancer. - 2004 radical prostatectomy complicated by mild stress incontinence.  - 2022 PSMA PET due to rising PSA - showing metastatic disease in the retroperitoneal and pelvic subcentimeter lymph nodes - Stage IV. - 01/10/2023 started Firmagon. - 02/14/2023 started Eligard 45 mg IM Q6M by Urology. - 08/20/2023 started Prolia. - 11/16/2023 ordered Nubequa for a month - but was too expensive as well as Erleda. - 12/27/2023 trial of Xtandi for 1 month - is costing him at this $600 a month and may be more starting January 2024. - 12/27/2023 PSA 1.76. - Nubequa was ordered again but insurance wanted to administer this with Taxotere.  - He will finish off his Xtandi since he has a month worth and then will switch to abiraterone. - 01/22/2024 started abiraterone 250 mg PO QD with prednisone 5 mg PO BID. - 06/04/2024 PSMA - Primary disease: Status post radical prostatectomy. No abnormal focal increased activity in the prostate surgical bed to suggest locoregional recurrence. Zee disease: No evidence of PSMA avid lymphadenopathy.  Interval complete resolution of previously seen PSMA avid retroperitoneal and pelvic lymph nodes. Metastasis:  New focal increased uptake in the sacrum, consistent with osseous metastasis.  Multiple new non-tracer avid subcentimeter sclerotic foci, possibly inactive osseous metastasis.  # PSA 03/2022 1.82 11/2022 17.26 12/2022 22.82 04/2023 3.86 08/2023 13.84 12/2023 1.76 02/08/2024 0.61 05/09/2024 1.46 06/20/2024 1.87  # peripheral neuropathy - chronic, diagnosed a long time ago.  # smoking about 1 pack per day - declined smoking cessation.  06/20/2024 Labs reviewed and results discussed with the patient and his daughters; c/o increasing pains at hips and lower back; we had a long discussion about his clinical status: increasing pains due to progression of the disease vs S/P fall and possible plans of care, but will finalized POC once PSMA will be uploaded and compared to all other available scans; for now we decided to continue with current plan. All questions were answered to satisfaction.  PLAN: - continue abiraterone 250 mg with low-fat breakfast with prednisone 5 mg daily. - continue Eligard Q6M and Prolia with urology. - continue Eliquis 2.5 mg Q12H. - continue Folic Acid 1 mg PO QD. - continue Calcium PO Q12H. - continue Vit.D 500K PO QW. - repeat PSMA PET in 3 months - 09/2024. - consider RT for pain management. - again, he was strongly advised to quit drinking, but decided not to adhere. - declined smoking cessation - will encourage. - collect Foundation One - ordered. - Labs today: CBC CMP PSA testosterone. RTC in 4 weeks with CBC CMP PSA testosterone. [AdvancecareDate] : 12/27/2023

## 2024-06-29 NOTE — END OF VISIT
[FreeTextEntry3] : I was physically present for the key portions of the evaluation and management service provided.  I agree with the history and physical, and plan which I have reviewed and edited where appropriate.  Braulio returns for follow-up he is currently on ADT and abiraterone for his recurrent prostate cancer PSMA showing new uptake in the sacrum only consistent with metastases nor the findings this is from June 17 his CT did show some foci which were multiple in the spine and pelvis this was done May 29.  There is also what appears to be a 10 mm IPMN  He is having some lower back pain it is difficult for me to say its from metastases or musculoskeletal we do not have imaging to review here.  His PSA has also been somewhat trending up 1.87  At this point I am not sure if there is definitive progression given that we did not have imaging prior to initiation of abiraterone earlier this year it is possible the lesion was already present.  Nevertheless I cannot exclude progression.  The fact the PSA is low we need to consider possible biopsy to rule out endocrine differentiation (84 years old) and the fact that it is sliding up usually points more towards less aggressive form of prostate cancer Obadiah a biopsy that cannot be definitively ruled out.  I decided to check foundation 1 liquid biopsy if he has a mutation on the testosterone receptor would likely then switch treatment if not we could consider continuing same treatment possibly radiating the lesion and repeating short-term scans in about 3 months.   In regards to his IPMN we could check a CAT scan again in 3 to 6 months as I am planning to do so  for his prostate cancer  RTC in 4 weeks

## 2024-06-29 NOTE — REVIEW OF SYSTEMS
[Loss of Hearing] : loss of hearing [Depression] : depression [Negative] : Allergic/Immunologic [FreeTextEntry9] : back pains

## 2024-06-29 NOTE — PHYSICAL EXAM
[Fully active, able to carry on all pre-disease performance without restriction] : Status 0 - Fully active, able to carry on all pre-disease performance without restriction [Normal] : affect appropriate [de-identified] : b/l hearing aids

## 2024-07-01 ENCOUNTER — NON-APPOINTMENT (OUTPATIENT)
Age: 84
End: 2024-07-01

## 2024-07-03 ENCOUNTER — OUTPATIENT (OUTPATIENT)
Dept: OUTPATIENT SERVICES | Facility: HOSPITAL | Age: 84
LOS: 1 days | End: 2024-07-03
Payer: MEDICARE

## 2024-07-03 DIAGNOSIS — C79.51 SECONDARY MALIGNANT NEOPLASM OF BONE: ICD-10-CM

## 2024-07-03 PROCEDURE — 77334 RADIATION TREATMENT AID(S): CPT | Mod: 26

## 2024-07-03 PROCEDURE — 77263 THER RADIOLOGY TX PLNG CPLX: CPT

## 2024-07-03 PROCEDURE — 77334 RADIATION TREATMENT AID(S): CPT

## 2024-07-05 DIAGNOSIS — C61 MALIGNANT NEOPLASM OF PROSTATE: ICD-10-CM

## 2024-07-05 DIAGNOSIS — C79.51 SECONDARY MALIGNANT NEOPLASM OF BONE: ICD-10-CM

## 2024-07-12 ENCOUNTER — OUTPATIENT (OUTPATIENT)
Dept: OUTPATIENT SERVICES | Facility: HOSPITAL | Age: 84
LOS: 1 days | Discharge: HOME | End: 2024-07-12
Payer: MEDICARE

## 2024-07-12 PROCEDURE — 77435 SBRT MANAGEMENT: CPT

## 2024-07-12 PROCEDURE — 77334 RADIATION TREATMENT AID(S): CPT | Mod: 26

## 2024-07-12 PROCEDURE — 77295 3-D RADIOTHERAPY PLAN: CPT | Mod: 26

## 2024-07-12 PROCEDURE — 77293 RESPIRATOR MOTION MGMT SIMUL: CPT | Mod: 26

## 2024-07-12 PROCEDURE — 77300 RADIATION THERAPY DOSE PLAN: CPT | Mod: 26

## 2024-07-17 DIAGNOSIS — C61 MALIGNANT NEOPLASM OF PROSTATE: ICD-10-CM

## 2024-07-17 DIAGNOSIS — C79.51 SECONDARY MALIGNANT NEOPLASM OF BONE: ICD-10-CM

## 2024-07-22 NOTE — DISEASE MANAGEMENT
[Clinical] : TNM Stage: c [TTNM] : x [NTNM] : x [MTNM] : x [IV] : IV [de-identified] : 800cGy [de-identified] : 2400cGy [de-identified] : Sacrum

## 2024-07-22 NOTE — DISEASE MANAGEMENT
[Clinical] : TNM Stage: c [TTNM] : x [NTNM] : x [MTNM] : x [IV] : IV [de-identified] : 800cGy [de-identified] : 2400cGy [de-identified] : Sacrum

## 2024-07-22 NOTE — HISTORY OF PRESENT ILLNESS
[FreeTextEntry1] : Mr. Braulio Cordoba is 84 yr old male here for his initial consultation for prostate cancer with bone mets. He has been in the care of urologist/ and Hemonc/.   He has past medical h/o HTN, DM, Afib(on Eliquis), Nueropathy (on Gabapentin), Prostate cancer with prostatectomy in 2004. He had a rise in PSA and was sent for PSMA scan. PSMA scan in 2022 demonstrated activity in his subcentimeter retroperitoneal and pelvic lymph nodes. He has been on ADT with  since that PSMA.  He has also been in the care of .He was on Xtandi and after January he started Abiraterone with prednisone and still continues that regimen. He was c/o increasing back pain that radiates to the bilateral hips for the past month and so he had a repeat PSMA in June. The PSMA from 6/2024 showed "New focal increased uptake in the sacrum, consistent with osseous metastasis". His pain can be an 8/10 when he ambulates. He feels better when he sits or lays down. He has used a cane for a few years even before this new pain. He is only taking Tylenol as needed for pain currently.    PSA  6/20/24: PSA 1.87ng/ml 5/9/24 PSA 1.46ng/ml  Most recent Imaging: Petscan/PSMA 6/17/2024: Impression: Primary disease: Status post radical prostatectomy. No abnormal focal increased activity in the prostate surgical bed to suggest locoregional recurrence. Zee disease: No evidence of PSMA avid lymphadenopathy. Interval complete resolution of previously seen PSMA avid retroperitoneal and pelvic lymph nodes. Metastasis: New focal increased uptake in the sacrum, consistent with osseous metastasis. Multiple new non-tracer avid subcentimeter sclerotic foci, possibly inactive osseous metastasis.

## 2024-07-22 NOTE — DISEASE MANAGEMENT
[Clinical] : TNM Stage: c [TTNM] : x [NTNM] : x [MTNM] : x [IV] : IV [de-identified] : 800cGy [de-identified] : 2400cGy [de-identified] : Sacrum

## 2024-07-23 ENCOUNTER — OUTPATIENT (OUTPATIENT)
Dept: OUTPATIENT SERVICES | Facility: HOSPITAL | Age: 84
LOS: 1 days | Discharge: HOME | End: 2024-07-23

## 2024-07-23 ENCOUNTER — NON-APPOINTMENT (OUTPATIENT)
Age: 84
End: 2024-07-23

## 2024-07-23 VITALS
BODY MASS INDEX: 27.13 KG/M2 | OXYGEN SATURATION: 100 % | SYSTOLIC BLOOD PRESSURE: 182 MMHG | WEIGHT: 200 LBS | HEART RATE: 69 BPM | DIASTOLIC BLOOD PRESSURE: 88 MMHG | RESPIRATION RATE: 16 BRPM | TEMPERATURE: 97.8 F

## 2024-07-23 DIAGNOSIS — C61 MALIGNANT NEOPLASM OF PROSTATE: ICD-10-CM

## 2024-07-24 ENCOUNTER — NON-APPOINTMENT (OUTPATIENT)
Age: 84
End: 2024-07-24

## 2024-07-25 ENCOUNTER — OUTPATIENT (OUTPATIENT)
Dept: OUTPATIENT SERVICES | Facility: HOSPITAL | Age: 84
LOS: 1 days | Discharge: HOME | End: 2024-07-25

## 2024-07-26 DIAGNOSIS — C61 MALIGNANT NEOPLASM OF PROSTATE: ICD-10-CM

## 2024-07-26 DIAGNOSIS — C79.51 SECONDARY MALIGNANT NEOPLASM OF BONE: ICD-10-CM

## 2024-07-29 ENCOUNTER — OUTPATIENT (OUTPATIENT)
Dept: OUTPATIENT SERVICES | Facility: HOSPITAL | Age: 84
LOS: 1 days | Discharge: HOME | End: 2024-07-29

## 2024-07-30 DIAGNOSIS — C79.51 SECONDARY MALIGNANT NEOPLASM OF BONE: ICD-10-CM

## 2024-07-30 DIAGNOSIS — C61 MALIGNANT NEOPLASM OF PROSTATE: ICD-10-CM

## 2024-07-31 ENCOUNTER — APPOINTMENT (OUTPATIENT)
Age: 84
End: 2024-07-31

## 2024-08-05 ENCOUNTER — LABORATORY RESULT (OUTPATIENT)
Age: 84
End: 2024-08-05

## 2024-08-05 ENCOUNTER — APPOINTMENT (OUTPATIENT)
Age: 84
End: 2024-08-05

## 2024-08-05 ENCOUNTER — OUTPATIENT (OUTPATIENT)
Dept: OUTPATIENT SERVICES | Facility: HOSPITAL | Age: 84
LOS: 1 days | End: 2024-08-05
Payer: MEDICARE

## 2024-08-05 DIAGNOSIS — C61 MALIGNANT NEOPLASM OF PROSTATE: ICD-10-CM

## 2024-08-05 PROCEDURE — 99214 OFFICE O/P EST MOD 30 MIN: CPT

## 2024-08-05 PROCEDURE — G2211 COMPLEX E/M VISIT ADD ON: CPT

## 2024-08-05 PROCEDURE — 85027 COMPLETE CBC AUTOMATED: CPT

## 2024-08-05 PROCEDURE — 80053 COMPREHEN METABOLIC PANEL: CPT

## 2024-08-05 PROCEDURE — 84153 ASSAY OF PSA TOTAL: CPT

## 2024-08-06 DIAGNOSIS — C61 MALIGNANT NEOPLASM OF PROSTATE: ICD-10-CM

## 2024-08-07 NOTE — PHYSICAL EXAM
[Fully active, able to carry on all pre-disease performance without restriction] : Status 0 - Fully active, able to carry on all pre-disease performance without restriction [Normal] : affect appropriate [de-identified] : b/l hearing aids

## 2024-08-07 NOTE — ASSESSMENT
[Palliative] : Goals of care discussed with patient: Palliative [Patient/Caregiver not ready to engage] : Patient/Caregiver not ready to engage [FreeTextEntry1] : # recurrent prostate cancer. - 2004 radical prostatectomy complicated by mild stress incontinence.  - 2022 PSMA PET due to rising PSA - showing metastatic disease in the retroperitoneal and pelvic subcentimeter lymph nodes - Stage IV. - 01/10/2023 started Firmagon. - 02/14/2023 started Eligard 45 mg IM Q6M by Urology. - 08/20/2023 started Prolia. - 11/16/2023 ordered Nubequa for a month - but was too expensive as well as Erleda. - 12/27/2023 trial of Xtandi for 1 month - is costing him at this $600 a month and may be more starting January 2024. - 12/27/2023 PSA 1.76. - Nubequa was ordered again but insurance wanted to administer this with Taxotere.  - He will finish off his Xtandi since he has a month worth and then will switch to abiraterone. - 01/22/2024 started abiraterone 250 mg PO QD with prednisone 5 mg PO BID. - 06/04/2024 PSMA - Primary disease: Status post radical prostatectomy. No abnormal focal increased activity in the prostate surgical bed to suggest locoregional recurrence. Zee disease: No evidence of PSMA avid lymphadenopathy.  Interval complete resolution of previously seen PSMA avid retroperitoneal and pelvic lymph nodes. Metastasis:  New focal increased uptake in the sacrum, consistent with osseous metastasis.  Multiple new non-tracer avid subcentimeter sclerotic foci, possibly inactive osseous metastasis. - 07/2024 radiation to sacrum. - Foundation one Liquid: Biomarker Findings Blood Tumor Mutational [place] - 4 Muts/Mb ctDNA Tumor Fraction - Low (< 1.0%) Microsatellite status - MSI-High Not Detected Genomic Findings For a complete list of the genes assayed, please refer to the Appendix. DNMT3A splice site 1937-2A>G RAD21 splice site [id number]  # PSA 03/2022 1.82 11/2022 17.26 12/2022 22.82 04/2023 3.86 08/2023 13.84 12/2023 1.76 02/08/2024 0.61 05/09/2024 1.46 06/20/2024 1.87  # Peripheral Neuropathy - chronic, diagnosed a long time ago.  # Smoking about 1 pack per day - declined smoking cessation.  # IPMN - will monitor with imaging.  08/05/2024 Labs reviewed and results discussed with the patient and his daughter; S/P RT to sacrum for palliation - reports not much improvement; we decided change Abiraterone with prednisone to enzalutamide and therefore Eliquis needs to be changed to Pradaxa for better anticoagulation coverage  - remains clinically stable to continue current management. All questions were answered to satisfaction.  PLAN: - d/c abiraterone 250 mg with low-fat breakfast with prednisone 5 mg daily. - start Xtandi / [id number] mg PO QD continue until disease progression or unacceptable toxicity. - d/c Eliquis 2.5 mg Q12H. - start Pradaxa / Dabigatran 150 mg PO BID. - continue Eligard Q6M and Prolia with urology. - continue Folic Acid 1 mg PO QD. - continue Calcium PO Q12H. - continue Vit.D 50K PO QW. - repeat CT C/A/P - 10/2024 - again, he was strongly advised to quit drinking, but decided not to adhere. - declined smoking cessation - will encourage. - Labs today: CBC CMP PSA. RTC in 4 weeks after switch to Xtandi with CBC CMP PSA testosterone. [AdvancecareDate] : 12/27/2023

## 2024-08-07 NOTE — HISTORY OF PRESENT ILLNESS
[N: ___] : N[unfilled] [AJCC Stage: ____] : AJCC Stage: [unfilled] [de-identified] : CC: I have prostate cancer  He is here at the request of Dr. Venkat Stcokton  This is an 83-year-old male with a history of neuropathy, HTN, DM2, afib, prostate adenocarcinoma, for which she had a radical prostatectomy in 2004 complicated by mild stress incontinence. He was noted to have rising PSA he underwent a PSMA PET/CT that demonstrated activity in his subcentimeter retroperitoneal and pelvic lymph nodes. He received 6 months shot of Eligard in August 20 to 23 and also was started on Prolia. Nubequa was ordered but this was too expensive. Erleda and Xtandi were also tried but they were expansive as well. Nubequa was ordered again but insurance wanted to administer this with Taxotere.   They were able to obtain Xtandi and he is on it now.  Review of blood work  PSA       date 1.82 March 2022 17.26        November 2022 22.82       December 2022 3.86         April 2023 13.84        August 2023 1.76         December 2023  Firmagon 1/10/2023 Eligard started 8/22/2023 He took Nubequa for a month started 9/2023 He started Xtandi about one week ago  Blood work from December 18, 2023 CBC is fine, CMP is normal as well, PSA as mentioned 1.76  PSMA PET/CT scan July 28, 2022 PSMA avid subcentimeter retroperitoneal and pelvic lymph nodes highly suspicious for metastatic disease [de-identified] : 02/07/2024 accompanied by his two daughters; Reports feeling well, except for neuropathy "pins and needles" at b/l below knees; no changes in chronic conditions or new complaints since the last visit. He still smokes 1 pack per day and consumes "vodka with club Soda "not much" three time per week - he is not interested to quit at this time.  05/09/2024 accompanied by his two daughters; Reports increasing depression.  06/20/2024 accompanied by his two daughters; Reports feeling well at the baseline of his health status, no changes in chronic conditions except for increasing back pains S/P his last fall in past.  8/5/2024 He is here for follow up. He had radiation to sacrum for palliation - not much improvement.....

## 2024-08-07 NOTE — ASSESSMENT
[Palliative] : Goals of care discussed with patient: Palliative [Patient/Caregiver not ready to engage] : Patient/Caregiver not ready to engage [FreeTextEntry1] : # recurrent prostate cancer. - 2004 radical prostatectomy complicated by mild stress incontinence.  - 2022 PSMA PET due to rising PSA - showing metastatic disease in the retroperitoneal and pelvic subcentimeter lymph nodes - Stage IV. - 01/10/2023 started Firmagon. - 02/14/2023 started Eligard 45 mg IM Q6M by Urology. - 08/20/2023 started Prolia. - 11/16/2023 ordered Nubequa for a month - but was too expensive as well as Erleda. - 12/27/2023 trial of Xtandi for 1 month - is costing him at this $600 a month and may be more starting January 2024. - 12/27/2023 PSA 1.76. - Nubequa was ordered again but insurance wanted to administer this with Taxotere.  - He will finish off his Xtandi since he has a month worth and then will switch to abiraterone. - 01/22/2024 started abiraterone 250 mg PO QD with prednisone 5 mg PO BID. - 06/04/2024 PSMA - Primary disease: Status post radical prostatectomy. No abnormal focal increased activity in the prostate surgical bed to suggest locoregional recurrence. Zee disease: No evidence of PSMA avid lymphadenopathy.  Interval complete resolution of previously seen PSMA avid retroperitoneal and pelvic lymph nodes. Metastasis:  New focal increased uptake in the sacrum, consistent with osseous metastasis.  Multiple new non-tracer avid subcentimeter sclerotic foci, possibly inactive osseous metastasis. - 07/2024 radiation to sacrum. - Foundation one Liquid: Biomarker Findings Blood Tumor Mutational [place] - 4 Muts/Mb ctDNA Tumor Fraction - Low (< 1.0%) Microsatellite status - MSI-High Not Detected Genomic Findings For a complete list of the genes assayed, please refer to the Appendix. DNMT3A splice site 1937-2A>G RAD21 splice site [id number]  # PSA 03/2022 1.82 11/2022 17.26 12/2022 22.82 04/2023 3.86 08/2023 13.84 12/2023 1.76 02/08/2024 0.61 05/09/2024 1.46 06/20/2024 1.87  # Peripheral Neuropathy - chronic, diagnosed a long time ago.  # Smoking about 1 pack per day - declined smoking cessation.  # IPMN - will monitor with imaging.  08/05/2024 Labs reviewed and results discussed with the patient and his daughter; S/P RT to sacrum for palliation - reports not much improvement; we decided change Abiraterone with prednisone to enzalutamide and therefore Eliquis needs to be changed to Pradaxa for better anticoagulation coverage  - remains clinically stable to continue current management. All questions were answered to satisfaction.  PLAN: - d/c abiraterone 250 mg with low-fat breakfast with prednisone 5 mg daily. - start Xtandi / [id number] mg PO QD continue until disease progression or unacceptable toxicity. - d/c Eliquis 2.5 mg Q12H. - start Pradaxa / Dabigatran 150 mg PO BID. - continue Eligard Q6M and Prolia with urology. - continue Folic Acid 1 mg PO QD. - continue Calcium PO Q12H. - continue Vit.D 50K PO QW. - repeat CT C/A/P - 10/2024 - again, he was strongly advised to quit drinking, but decided not to adhere. - declined smoking cessation - will encourage. - Labs today: CBC CMP PSA. RTC in 4 weeks after switch to Xtandi with CBC CMP PSA testosterone. [AdvancecareDate] : 12/27/2023

## 2024-08-07 NOTE — HISTORY OF PRESENT ILLNESS
[N: ___] : N[unfilled] [AJCC Stage: ____] : AJCC Stage: [unfilled] [de-identified] : CC: I have prostate cancer  He is here at the request of Dr. Venkat Stockton  This is an 83-year-old male with a history of neuropathy, HTN, DM2, afib, prostate adenocarcinoma, for which she had a radical prostatectomy in 2004 complicated by mild stress incontinence. He was noted to have rising PSA he underwent a PSMA PET/CT that demonstrated activity in his subcentimeter retroperitoneal and pelvic lymph nodes. He received 6 months shot of Eligard in August 20 to 23 and also was started on Prolia. Nubequa was ordered but this was too expensive. Erleda and Xtandi were also tried but they were expansive as well. Nubequa was ordered again but insurance wanted to administer this with Taxotere.   They were able to obtain Xtandi and he is on it now.  Review of blood work  PSA       date 1.82 March 2022 17.26        November 2022 22.82       December 2022 3.86         April 2023 13.84        August 2023 1.76         December 2023  Firmagon 1/10/2023 Eligard started 8/22/2023 He took Nubequa for a month started 9/2023 He started Xtandi about one week ago  Blood work from December 18, 2023 CBC is fine, CMP is normal as well, PSA as mentioned 1.76  PSMA PET/CT scan July 28, 2022 PSMA avid subcentimeter retroperitoneal and pelvic lymph nodes highly suspicious for metastatic disease [de-identified] : 02/07/2024 accompanied by his two daughters; Reports feeling well, except for neuropathy "pins and needles" at b/l below knees; no changes in chronic conditions or new complaints since the last visit. He still smokes 1 pack per day and consumes "vodka with club Soda "not much" three time per week - he is not interested to quit at this time.  05/09/2024 accompanied by his two daughters; Reports increasing depression.  06/20/2024 accompanied by his two daughters; Reports feeling well at the baseline of his health status, no changes in chronic conditions except for increasing back pains S/P his last fall in past.  8/5/2024 He is here for follow up. He had radiation to sacrum for palliation - not much improvement.....

## 2024-08-07 NOTE — PHYSICAL EXAM
[Fully active, able to carry on all pre-disease performance without restriction] : Status 0 - Fully active, able to carry on all pre-disease performance without restriction [Normal] : affect appropriate [de-identified] : b/l hearing aids

## 2024-08-07 NOTE — PHYSICAL EXAM
[Fully active, able to carry on all pre-disease performance without restriction] : Status 0 - Fully active, able to carry on all pre-disease performance without restriction [Normal] : affect appropriate [de-identified] : b/l hearing aids

## 2024-08-07 NOTE — ASSESSMENT
[Palliative] : Goals of care discussed with patient: Palliative [Patient/Caregiver not ready to engage] : Patient/Caregiver not ready to engage [FreeTextEntry1] : # recurrent prostate cancer. - 2004 radical prostatectomy complicated by mild stress incontinence.  - 2022 PSMA PET due to rising PSA - showing metastatic disease in the retroperitoneal and pelvic subcentimeter lymph nodes - Stage IV. - 01/10/2023 started Firmagon. - 02/14/2023 started Eligard 45 mg IM Q6M by Urology. - 08/20/2023 started Prolia. - 11/16/2023 ordered Nubequa for a month - but was too expensive as well as Erleda. - 12/27/2023 trial of Xtandi for 1 month - is costing him at this $600 a month and may be more starting January 2024. - 12/27/2023 PSA 1.76. - Nubequa was ordered again but insurance wanted to administer this with Taxotere.  - He will finish off his Xtandi since he has a month worth and then will switch to abiraterone. - 01/22/2024 started abiraterone 250 mg PO QD with prednisone 5 mg PO BID. - 06/04/2024 PSMA - Primary disease: Status post radical prostatectomy. No abnormal focal increased activity in the prostate surgical bed to suggest locoregional recurrence. Zee disease: No evidence of PSMA avid lymphadenopathy.  Interval complete resolution of previously seen PSMA avid retroperitoneal and pelvic lymph nodes. Metastasis:  New focal increased uptake in the sacrum, consistent with osseous metastasis.  Multiple new non-tracer avid subcentimeter sclerotic foci, possibly inactive osseous metastasis. - 07/2024 radiation to sacrum. - Foundation one Liquid: Biomarker Findings Blood Tumor Mutational [place] - 4 Muts/Mb ctDNA Tumor Fraction - Low (< 1.0%) Microsatellite status - MSI-High Not Detected Genomic Findings For a complete list of the genes assayed, please refer to the Appendix. DNMT3A splice site 1937-2A>[id number] RAD21 splice site [id number]  # PSA 03/2022 1.82 11/2022 17.26 12/2022 22.82 04/2023 3.86 08/2023 13.84 12/2023 1.76 02/08/2024 0.61 05/09/2024 1.46 06/20/2024 1.87  # Peripheral Neuropathy - chronic, diagnosed a long time ago.  # Smoking about 1 pack per day - declined smoking cessation.  # IPMN - will monitor with imaging.  08/05/2024 Labs reviewed and results discussed with the patient and his daughter; S/P RT to sacrum for palliation - reports not much improvement; we decided change Abiraterone with prednisone to enzalutamide and therefore Eliquis needs to be changed to Pradaxa for better anticoagulation coverage  - remains clinically stable to continue current management. All questions were answered to satisfaction.  PLAN: - d/c abiraterone 250 mg with low-fat breakfast with prednisone 5 mg daily. - start Xtandi / [id number] mg PO QD continue until disease progression or unacceptable toxicity. - d/c Eliquis 2.5 mg Q12H. - start Pradaxa / Dabigatran 150 mg PO BID. - continue Eligard Q6M and Prolia with urology. - continue Folic Acid 1 mg PO QD. - continue Calcium PO Q12H. - continue Vit.D 50K PO QW. - repeat CT C/A/P - 10/2024 - again, he was strongly advised to quit drinking, but decided not to adhere. - declined smoking cessation - will encourage. - Labs today: CBC CMP PSA. RTC in 4 weeks after switch to Xtandi with CBC CMP PSA testosterone. [AdvancecareDate] : 12/27/2023

## 2024-08-07 NOTE — END OF VISIT
[FreeTextEntry3] : I was physically present for the key portions of the evaluation and management service provided.  I agree with the history and physical, and plan which I have reviewed and edited where appropriate.  He is here for follow-up we have the results of the foundation 1 liquid biopsy CT DNA is low no HRD mutations were found and no mutations for androgen receptor were found.  See scanned note.  He did have radiation although his pain did not improve.  We went over options which is continuation of current treatment granted he may have developed progression versus switching him back to enzalutamide hopefully will be approved by his insurance and he decided to try enzalutamide again so it was sent to [facility]iva 160 mg daily and due to its interactions with certain DOACs we will switch him to Pradaxa 150 mg twice a day he will continue with Eligard as above.  He remains on Prolia as well with his urologist Dr. Stockton.  Will possibly consider monthly dosing in the future.  He will see us back in 4 weeks

## 2024-08-08 ENCOUNTER — APPOINTMENT (OUTPATIENT)
Age: 84
End: 2024-08-08

## 2024-08-30 ENCOUNTER — INPATIENT (INPATIENT)
Facility: HOSPITAL | Age: 84
LOS: 3 days | Discharge: HOME | DRG: 603 | End: 2024-09-03
Attending: INTERNAL MEDICINE | Admitting: STUDENT IN AN ORGANIZED HEALTH CARE EDUCATION/TRAINING PROGRAM
Payer: MEDICARE

## 2024-08-30 VITALS
WEIGHT: 182.1 LBS | SYSTOLIC BLOOD PRESSURE: 123 MMHG | OXYGEN SATURATION: 99 % | TEMPERATURE: 98 F | HEART RATE: 97 BPM | RESPIRATION RATE: 103 BRPM | HEIGHT: 72 IN | DIASTOLIC BLOOD PRESSURE: 67 MMHG

## 2024-08-30 DIAGNOSIS — L03.90 CELLULITIS, UNSPECIFIED: ICD-10-CM

## 2024-08-30 LAB
ALBUMIN SERPL ELPH-MCNC: 3.9 G/DL — SIGNIFICANT CHANGE UP (ref 3.5–5.2)
ALP SERPL-CCNC: 84 U/L — SIGNIFICANT CHANGE UP (ref 30–115)
ALT FLD-CCNC: 6 U/L — SIGNIFICANT CHANGE UP (ref 0–41)
ANION GAP SERPL CALC-SCNC: 15 MMOL/L — HIGH (ref 7–14)
APPEARANCE UR: CLEAR — SIGNIFICANT CHANGE UP
APTT BLD: 45 SEC — HIGH (ref 27–39.2)
AST SERPL-CCNC: 12 U/L — SIGNIFICANT CHANGE UP (ref 0–41)
BASOPHILS # BLD AUTO: 0.06 K/UL — SIGNIFICANT CHANGE UP (ref 0–0.2)
BASOPHILS NFR BLD AUTO: 0.4 % — SIGNIFICANT CHANGE UP (ref 0–1)
BILIRUB SERPL-MCNC: 0.8 MG/DL — SIGNIFICANT CHANGE UP (ref 0.2–1.2)
BILIRUB UR-MCNC: NEGATIVE — SIGNIFICANT CHANGE UP
BUN SERPL-MCNC: 18 MG/DL — SIGNIFICANT CHANGE UP (ref 10–20)
CALCIUM SERPL-MCNC: 9.5 MG/DL — SIGNIFICANT CHANGE UP (ref 8.4–10.5)
CHLORIDE SERPL-SCNC: 98 MMOL/L — SIGNIFICANT CHANGE UP (ref 98–110)
CO2 SERPL-SCNC: 24 MMOL/L — SIGNIFICANT CHANGE UP (ref 17–32)
COLOR SPEC: YELLOW — SIGNIFICANT CHANGE UP
CREAT SERPL-MCNC: 0.8 MG/DL — SIGNIFICANT CHANGE UP (ref 0.7–1.5)
CRP SERPL-MCNC: 115.6 MG/L — HIGH
DIFF PNL FLD: NEGATIVE — SIGNIFICANT CHANGE UP
EGFR: 87 ML/MIN/1.73M2 — SIGNIFICANT CHANGE UP
EOSINOPHIL # BLD AUTO: 0.02 K/UL — SIGNIFICANT CHANGE UP (ref 0–0.7)
EOSINOPHIL NFR BLD AUTO: 0.1 % — SIGNIFICANT CHANGE UP (ref 0–8)
ERYTHROCYTE [SEDIMENTATION RATE] IN BLOOD: 64 MM/HR — HIGH (ref 0–10)
GLUCOSE BLDC GLUCOMTR-MCNC: 130 MG/DL — HIGH (ref 70–99)
GLUCOSE BLDC GLUCOMTR-MCNC: 172 MG/DL — HIGH (ref 70–99)
GLUCOSE BLDC GLUCOMTR-MCNC: 185 MG/DL — HIGH (ref 70–99)
GLUCOSE BLDC GLUCOMTR-MCNC: 208 MG/DL — HIGH (ref 70–99)
GLUCOSE SERPL-MCNC: 161 MG/DL — HIGH (ref 70–99)
GLUCOSE UR QL: NEGATIVE MG/DL — SIGNIFICANT CHANGE UP
HCT VFR BLD CALC: 35.8 % — LOW (ref 42–52)
HGB BLD-MCNC: 12.1 G/DL — LOW (ref 14–18)
IMM GRANULOCYTES NFR BLD AUTO: 0.5 % — HIGH (ref 0.1–0.3)
INR BLD: 1.45 RATIO — HIGH (ref 0.65–1.3)
KETONES UR-MCNC: NEGATIVE MG/DL — SIGNIFICANT CHANGE UP
LEUKOCYTE ESTERASE UR-ACNC: NEGATIVE — SIGNIFICANT CHANGE UP
LYMPHOCYTES # BLD AUTO: 1.1 K/UL — LOW (ref 1.2–3.4)
LYMPHOCYTES # BLD AUTO: 7.5 % — LOW (ref 20.5–51.1)
MCHC RBC-ENTMCNC: 31.5 PG — HIGH (ref 27–31)
MCHC RBC-ENTMCNC: 33.8 G/DL — SIGNIFICANT CHANGE UP (ref 32–37)
MCV RBC AUTO: 93.2 FL — SIGNIFICANT CHANGE UP (ref 80–94)
MONOCYTES # BLD AUTO: 1.19 K/UL — HIGH (ref 0.1–0.6)
MONOCYTES NFR BLD AUTO: 8.1 % — SIGNIFICANT CHANGE UP (ref 1.7–9.3)
NEUTROPHILS # BLD AUTO: 12.25 K/UL — HIGH (ref 1.4–6.5)
NEUTROPHILS NFR BLD AUTO: 83.4 % — HIGH (ref 42.2–75.2)
NITRITE UR-MCNC: NEGATIVE — SIGNIFICANT CHANGE UP
NRBC # BLD: 0 /100 WBCS — SIGNIFICANT CHANGE UP (ref 0–0)
PH UR: 5.5 — SIGNIFICANT CHANGE UP (ref 5–8)
PLATELET # BLD AUTO: 219 K/UL — SIGNIFICANT CHANGE UP (ref 130–400)
PMV BLD: 10.5 FL — HIGH (ref 7.4–10.4)
POTASSIUM SERPL-MCNC: 3.5 MMOL/L — SIGNIFICANT CHANGE UP (ref 3.5–5)
POTASSIUM SERPL-SCNC: 3.5 MMOL/L — SIGNIFICANT CHANGE UP (ref 3.5–5)
PROT SERPL-MCNC: 6.7 G/DL — SIGNIFICANT CHANGE UP (ref 6–8)
PROT UR-MCNC: NEGATIVE MG/DL — SIGNIFICANT CHANGE UP
PROTHROM AB SERPL-ACNC: 16.6 SEC — HIGH (ref 9.95–12.87)
RBC # BLD: 3.84 M/UL — LOW (ref 4.7–6.1)
RBC # FLD: 13.8 % — SIGNIFICANT CHANGE UP (ref 11.5–14.5)
SODIUM SERPL-SCNC: 137 MMOL/L — SIGNIFICANT CHANGE UP (ref 135–146)
SP GR SPEC: 1.01 — SIGNIFICANT CHANGE UP (ref 1–1.03)
UROBILINOGEN FLD QL: 0.2 MG/DL — SIGNIFICANT CHANGE UP (ref 0.2–1)
WBC # BLD: 14.69 K/UL — HIGH (ref 4.8–10.8)
WBC # FLD AUTO: 14.69 K/UL — HIGH (ref 4.8–10.8)

## 2024-08-30 PROCEDURE — 84100 ASSAY OF PHOSPHORUS: CPT

## 2024-08-30 PROCEDURE — 83036 HEMOGLOBIN GLYCOSYLATED A1C: CPT

## 2024-08-30 PROCEDURE — 84550 ASSAY OF BLOOD/URIC ACID: CPT

## 2024-08-30 PROCEDURE — 85730 THROMBOPLASTIN TIME PARTIAL: CPT

## 2024-08-30 PROCEDURE — 36415 COLL VENOUS BLD VENIPUNCTURE: CPT

## 2024-08-30 PROCEDURE — 71045 X-RAY EXAM CHEST 1 VIEW: CPT | Mod: 26

## 2024-08-30 PROCEDURE — 81003 URINALYSIS AUTO W/O SCOPE: CPT

## 2024-08-30 PROCEDURE — 73070 X-RAY EXAM OF ELBOW: CPT | Mod: 26,LT

## 2024-08-30 PROCEDURE — 99285 EMERGENCY DEPT VISIT HI MDM: CPT | Mod: FS

## 2024-08-30 PROCEDURE — 93010 ELECTROCARDIOGRAM REPORT: CPT

## 2024-08-30 PROCEDURE — 85610 PROTHROMBIN TIME: CPT

## 2024-08-30 PROCEDURE — 93005 ELECTROCARDIOGRAM TRACING: CPT

## 2024-08-30 PROCEDURE — 99223 1ST HOSP IP/OBS HIGH 75: CPT

## 2024-08-30 PROCEDURE — 80048 BASIC METABOLIC PNL TOTAL CA: CPT

## 2024-08-30 PROCEDURE — 82962 GLUCOSE BLOOD TEST: CPT

## 2024-08-30 PROCEDURE — 85027 COMPLETE CBC AUTOMATED: CPT

## 2024-08-30 PROCEDURE — 85025 COMPLETE CBC W/AUTO DIFF WBC: CPT

## 2024-08-30 PROCEDURE — 83735 ASSAY OF MAGNESIUM: CPT

## 2024-08-30 RX ORDER — LOSARTAN POTASSIUM 50 MG/1
1 TABLET ORAL
Refills: 0 | DISCHARGE

## 2024-08-30 RX ORDER — FOLIC ACID 1 MG
1 TABLET ORAL
Refills: 0 | DISCHARGE

## 2024-08-30 RX ORDER — NIFEDIPINE 60 MG/1
1 TABLET, FILM COATED, EXTENDED RELEASE ORAL
Refills: 0 | DISCHARGE

## 2024-08-30 RX ORDER — DABIGATRAN ETEXILATE MESYLATE 40 MG/1
1 PELLET ORAL
Refills: 0 | DISCHARGE

## 2024-08-30 RX ORDER — DEXTROSE 15 G/33 G
25 GEL IN PACKET (GRAM) ORAL ONCE
Refills: 0 | Status: DISCONTINUED | OUTPATIENT
Start: 2024-08-30 | End: 2024-09-03

## 2024-08-30 RX ORDER — VANCOMYCIN/0.9 % SOD CHLORIDE 1.75G/25
PLASTIC BAG, INJECTION (ML) INTRAVENOUS
Refills: 0 | Status: DISCONTINUED | OUTPATIENT
Start: 2024-08-30 | End: 2024-08-30

## 2024-08-30 RX ORDER — DEXTROSE 15 G/33 G
12.5 GEL IN PACKET (GRAM) ORAL ONCE
Refills: 0 | Status: DISCONTINUED | OUTPATIENT
Start: 2024-08-30 | End: 2024-09-03

## 2024-08-30 RX ORDER — VANCOMYCIN/0.9 % SOD CHLORIDE 1.75G/25
1000 PLASTIC BAG, INJECTION (ML) INTRAVENOUS EVERY 12 HOURS
Refills: 0 | Status: DISCONTINUED | OUTPATIENT
Start: 2024-08-30 | End: 2024-08-30

## 2024-08-30 RX ORDER — CYANOCOBALAMIN (VITAMIN B-12) 500MCG/0.1
1 GEL (ML) NASAL
Refills: 0 | DISCHARGE

## 2024-08-30 RX ORDER — VANCOMYCIN/0.9 % SOD CHLORIDE 1.75G/25
1000 PLASTIC BAG, INJECTION (ML) INTRAVENOUS ONCE
Refills: 0 | Status: COMPLETED | OUTPATIENT
Start: 2024-08-30 | End: 2024-08-30

## 2024-08-30 RX ORDER — ENZALUTAMIDE 40 MG/1
160 TABLET ORAL DAILY
Refills: 0 | Status: DISCONTINUED | OUTPATIENT
Start: 2024-08-30 | End: 2024-09-03

## 2024-08-30 RX ORDER — DABIGATRAN ETEXILATE MESYLATE 40 MG/1
300 PELLET ORAL
Refills: 0 | DISCHARGE

## 2024-08-30 RX ORDER — PREDNISONE 10 MG
40 TABLET, DOSE PACK ORAL DAILY
Refills: 0 | Status: DISCONTINUED | OUTPATIENT
Start: 2024-08-30 | End: 2024-09-03

## 2024-08-30 RX ORDER — METOPROLOL TARTRATE 100 MG/1
25 TABLET ORAL DAILY
Refills: 0 | Status: DISCONTINUED | OUTPATIENT
Start: 2024-08-30 | End: 2024-09-03

## 2024-08-30 RX ORDER — METFORMIN HYDROCHLORIDE 850 MG/1
1 TABLET, FILM COATED ORAL
Refills: 0 | DISCHARGE

## 2024-08-30 RX ORDER — ACETAMINOPHEN 325 MG/1
650 TABLET ORAL EVERY 6 HOURS
Refills: 0 | Status: DISCONTINUED | OUTPATIENT
Start: 2024-08-30 | End: 2024-09-03

## 2024-08-30 RX ORDER — GABAPENTIN 100 MG
1 CAPSULE ORAL
Refills: 0 | DISCHARGE

## 2024-08-30 RX ORDER — DEXTROSE 15 G/33 G
15 GEL IN PACKET (GRAM) ORAL ONCE
Refills: 0 | Status: DISCONTINUED | OUTPATIENT
Start: 2024-08-30 | End: 2024-09-03

## 2024-08-30 RX ORDER — METRONIDAZOLE 250 MG
500 TABLET ORAL ONCE
Refills: 0 | Status: COMPLETED | OUTPATIENT
Start: 2024-08-30 | End: 2024-08-30

## 2024-08-30 RX ORDER — GLUCAGON INJECTION, SOLUTION 1 MG/.2ML
1 INJECTION, SOLUTION SUBCUTANEOUS ONCE
Refills: 0 | Status: DISCONTINUED | OUTPATIENT
Start: 2024-08-30 | End: 2024-09-03

## 2024-08-30 RX ORDER — ENZALUTAMIDE 40 MG/1
160 TABLET ORAL
Refills: 0 | DISCHARGE

## 2024-08-30 RX ORDER — LOSARTAN POTASSIUM 50 MG/1
100 TABLET ORAL DAILY
Refills: 0 | Status: DISCONTINUED | OUTPATIENT
Start: 2024-08-30 | End: 2024-09-03

## 2024-08-30 RX ORDER — GABAPENTIN 100 MG
2 CAPSULE ORAL
Refills: 0 | DISCHARGE

## 2024-08-30 RX ORDER — THIAMINE HCL 250 MG
1 TABLET ORAL
Refills: 0 | DISCHARGE

## 2024-08-30 RX ORDER — NIFEDIPINE 60 MG/1
30 TABLET, FILM COATED, EXTENDED RELEASE ORAL EVERY 12 HOURS
Refills: 0 | Status: DISCONTINUED | OUTPATIENT
Start: 2024-08-30 | End: 2024-09-03

## 2024-08-30 RX ORDER — NIFEDIPINE 60 MG/1
60 TABLET, FILM COATED, EXTENDED RELEASE ORAL DAILY
Refills: 0 | Status: DISCONTINUED | OUTPATIENT
Start: 2024-08-30 | End: 2024-08-30

## 2024-08-30 RX ORDER — METOPROLOL TARTRATE 100 MG/1
1 TABLET ORAL
Refills: 0 | DISCHARGE

## 2024-08-30 RX ORDER — ACETAMINOPHEN 325 MG/1
650 TABLET ORAL ONCE
Refills: 0 | Status: COMPLETED | OUTPATIENT
Start: 2024-08-30 | End: 2024-08-30

## 2024-08-30 RX ORDER — CEFAZOLIN SODIUM 2 G/100ML
1000 INJECTION, SOLUTION INTRAVENOUS EVERY 8 HOURS
Refills: 0 | Status: DISCONTINUED | OUTPATIENT
Start: 2024-08-30 | End: 2024-09-03

## 2024-08-30 RX ORDER — DABIGATRAN ETEXILATE MESYLATE 40 MG/1
150 PELLET ORAL EVERY 12 HOURS
Refills: 0 | Status: DISCONTINUED | OUTPATIENT
Start: 2024-08-31 | End: 2024-09-03

## 2024-08-30 RX ORDER — SODIUM CHLORIDE 9 MG/ML
500 INJECTION INTRAMUSCULAR; INTRAVENOUS; SUBCUTANEOUS ONCE
Refills: 0 | Status: COMPLETED | OUTPATIENT
Start: 2024-08-30 | End: 2024-08-30

## 2024-08-30 RX ADMIN — Medication 1: at 11:34

## 2024-08-30 RX ADMIN — NIFEDIPINE 30 MILLIGRAM(S): 60 TABLET, FILM COATED, EXTENDED RELEASE ORAL at 17:21

## 2024-08-30 RX ADMIN — CEFAZOLIN SODIUM 50 MILLIGRAM(S): 2 INJECTION, SOLUTION INTRAVENOUS at 22:17

## 2024-08-30 RX ADMIN — Medication 40 MILLIGRAM(S): at 14:27

## 2024-08-30 RX ADMIN — ACETAMINOPHEN 650 MILLIGRAM(S): 325 TABLET ORAL at 02:25

## 2024-08-30 RX ADMIN — Medication 100 MILLIGRAM(S): at 02:56

## 2024-08-30 RX ADMIN — Medication 5.5 UNIT(S): at 11:33

## 2024-08-30 RX ADMIN — CEFAZOLIN SODIUM 50 MILLIGRAM(S): 2 INJECTION, SOLUTION INTRAVENOUS at 14:26

## 2024-08-30 RX ADMIN — Medication 250 MILLIGRAM(S): at 11:29

## 2024-08-30 RX ADMIN — Medication 100 MILLIGRAM(S): at 02:25

## 2024-08-30 RX ADMIN — SODIUM CHLORIDE 500 MILLILITER(S): 9 INJECTION INTRAMUSCULAR; INTRAVENOUS; SUBCUTANEOUS at 02:25

## 2024-08-30 NOTE — H&P ADULT - NSICDXFAMILYHX_GEN_ALL_CORE_FT
FAMILY HISTORY:  Family history of throat cancer    Father  Still living? No  Family history of cardiac disorder in sister, Age at diagnosis: Age Unknown    Mother  Still living? No  Family history of cardiac disorder in sister, Age at diagnosis: Age Unknown    Sibling  Still living? Unknown  Family history of cardiac disorder in sister, Age at diagnosis: Age Unknown

## 2024-08-30 NOTE — H&P ADULT - NSHPLABSRESULTS_GEN_ALL_CORE
LABS:                        12.1   14.69 )-----------( 219      ( 30 Aug 2024 02:15 )             35.8           137  |  98  |  18  ----------------------------<  161<H>  3.5   |  24  |  0.8    Ca    9.5      30 Aug 2024 02:15    TPro  6.7  /  Alb  3.9  /  TBili  0.8  /  DBili  x   /  AST  12  /  ALT  6   /  AlkPhos  84           Urinalysis Basic - ( 30 Aug 2024 08:40 )    Color: Yellow / Appearance: Clear / S.011 / pH: x  Gluc: x / Ketone: Negative mg/dL  / Bili: Negative / Urobili: 0.2 mg/dL   Blood: x / Protein: Negative mg/dL / Nitrite: Negative   Leuk Esterase: Negative / RBC: x / WBC x   Sq Epi: x / Non Sq Epi: x / Bacteria: x        PT/INR - ( 30 Aug 2024 03:30 )   PT: 16.60 sec;   INR: 1.45 ratio         PTT - ( 30 Aug 2024 03:30 )  PTT:45.0 sec      RADIOLOGY:  < from: Xray Chest 1 View- PORTABLE-Urgent (24 @ 02:04) >    ACC: [id number] EXAM:  XR CHEST PORTABLE URGENT 1V   ORDERED BY: LEANA YANG     PROCEDURE DATE:  2024          INTERPRETATION:  Clinical History / Reason for exam: Chest pain    Comparison : PET CT dated 2024.    Technique/Positioning:Frontal view.    Findings:    Support devices: None.    Cardiac/mediastinum/hilum: Unremarkable.    Lung parenchyma/Pleura: No consolidation, pleural effusion or   pneumothorax. Extensive bilateral pleural plaques    Skeleton/soft tissues: No significant osseous abnormality is seen.      Impression:      No acute consolidation. Extensive bilateral calcified pleural plaques.    < end of copied text > LABS:                        12.1   14.69 )-----------( 219      ( 30 Aug 2024 02:15 )             35.8           137  |  98  |  18  ----------------------------<  161<H>  3.5   |  24  |  0.8    Ca    9.5      30 Aug 2024 02:15    TPro  6.7  /  Alb  3.9  /  TBili  0.8  /  DBili  x   /  AST  12  /  ALT  6   /  AlkPhos  84           Urinalysis Basic - ( 30 Aug 2024 08:40 )    Color: Yellow / Appearance: Clear / S.011 / pH: x  Gluc: x / Ketone: Negative mg/dL  / Bili: Negative / Urobili: 0.2 mg/dL   Blood: x / Protein: Negative mg/dL / Nitrite: Negative   Leuk Esterase: Negative / RBC: x / WBC x   Sq Epi: x / Non Sq Epi: x / Bacteria: x        PT/INR - ( 30 Aug 2024 03:30 )   PT: 16.60 sec;   INR: 1.45 ratio         PTT - ( 30 Aug 2024 03:30 )  PTT:45.0 sec      RADIOLOGY:  < from: Xray Chest 1 View- PORTABLE-Urgent (24 @ 02:04) >    ACC: [id number] EXAM:  XR CHEST PORTABLE URGENT 1V   ORDERED BY: LEANA YANG     PROCEDURE DATE:  2024          INTERPRETATION:  Clinical History / Reason for exam: Chest pain    Comparison : PET CT dated 2024.    Technique/Positioning:Frontal view.    Findings:    Support devices: None.    Cardiac/mediastinum/hilum: Unremarkable.    Lung parenchyma/Pleura: No consolidation, pleural effusion or   pneumothorax. Extensive bilateral pleural plaques    Skeleton/soft tissues: No significant osseous abnormality is seen.      Impression:      No acute consolidation. Extensive bilateral calcified pleural plaques.    < end of copied text >    < from: Xray Elbow AP + Lateral, Left (24 @ 02:05) >      ACC: [id number] EXAM:  XR ELBOW 2 VIEWS LT   ORDERED BY: LEANA YANG     PROCEDURE DATE:  2024          INTERPRETATION:  Clinical history: Cellulitis  Technique: XR ELBOW 3 VIEWS LEFT  Comparison: None available    Findings:    No acute fracture or acute articular abnormality. Productive changes   involving the medial epicondyle compatible with sequela of prior injury.   There is a questionable elbow joint effusion. No evidence of soft tissue   gas. No radiopaque foreign body is identified.    Impression:    Questionable elbow joint effusion. No definite fracture or osteomyelitis.   No radiopaque foreign body.    < end of copied text >

## 2024-08-30 NOTE — ED ADULT TRIAGE NOTE - CHIEF COMPLAINT QUOTE
Patient with swelling, tenderness and redness to L forearm after a gout attack to L elbow. Patient also reports pruritis to L arm.

## 2024-08-30 NOTE — H&P ADULT - NSHPPHYSICALEXAM_GEN_ALL_CORE
Vital Signs Last 24 Hrs  T(C): 37.1 (30 Aug 2024 07:46), Max: 37.1 (30 Aug 2024 07:46)  T(F): 98.8 (30 Aug 2024 07:46), Max: 98.8 (30 Aug 2024 07:46)  HR: 81 (30 Aug 2024 07:46) (81 - 97)  BP: 116/71 (30 Aug 2024 07:46) (116/71 - 123/67)  BP(mean): --  RR: 18 (30 Aug 2024 07:46) (18 - 18)  SpO2: 98% (30 Aug 2024 07:46) (98% - 99%)  Parameters below as of 30 Aug 2024 07:46  Patient On (Oxygen Delivery Method): room air      PHYSICAL EXAM:  GENERAL: NAD, lying in bed comfortably  HEAD:  Atraumatic, Normocephalic  EYES: EOMI, PERRLA, conjunctiva and sclera clear  ENMT: No tonsillar erythema, exudates, or enlargement; Moist mucous membranes  NECK: Supple, No JVD, Normal thyroid  HEART: Irregularly irregular rate and rhythm; No murmurs, rubs, or gallops  RESPIRATORY: CTA B/L, No W/R/R  ABDOMEN: Soft, Nontender, Distended (2/2 body habitus); Bowel sounds present  NEUROLOGY: A&Ox3, nonfocal, moving all extremities  EXTREMITIES:  2+ Peripheral Pulses, Edema in LUE elbow to hand, Erythema in LUE elbow to hand, Warmth in LUE elbow to hand, laceration present on LUE anterior forearm, not acutely bleeding or draining, scabbed over, red lesion anterior and medial to LUE elbow, nontender, LUE TTP elbow to hand, Dec ROM in LUE elbow 2/2 pain, normal ROM in LUE shoulder

## 2024-08-30 NOTE — ED PROVIDER NOTE - NS ED MD DISPO DIVISION
Render In Strict Bullet Format?: No
Initiate Treatment: Mometasone ointment apply to wet skin BID x 3 days a week. Follow with Vaseline.
Detail Level: Zone
[facility]

## 2024-08-30 NOTE — ED PROVIDER NOTE - PHYSICAL EXAMINATION
Constitutional: Well developed, well nourished. NAD  Head: Normocephalic, atraumatic.  Eyes: PERRL, EOMI.  ENT: No nasal discharge. Mucous membranes dry.  Neck: Supple. Painless ROM.  Cardiovascular: Regular rate and rhythm.    Pulmonary:   Lungs clear to auscultation bilaterally.    Abdominal: Soft. Nondistended. No rebound, guarding, rigidity.  Extremities. Pelvis stable Left arm: + healing distal forearm abrasion;; + redness, swelling, warmth and limited ROM to Left elbow;   Skin: No rashes, cyanosis.  Neuro: AAOx3. No focal neurological deficits.  Psych: Normal mood. Normal affect.

## 2024-08-30 NOTE — H&P ADULT - TIME BILLING
extensive discussion with patient, daughter, resident, reviewing er notes, resident notes, examining patient, documenting, planning thereutic interventions

## 2024-08-30 NOTE — ED PROVIDER NOTE - NSICDXPASTMEDICALHX_GEN_ALL_CORE_FT
PAST MEDICAL HISTORY:  AF (atrial fibrillation)     Diabetes     HLD (hyperlipidemia)     HTN (hypertension)     Prostate CA

## 2024-08-30 NOTE — CONSULT NOTE ADULT - ASSESSMENT
84yoM with a PMH of prostate cancer on xtandi, A. fib on dabigatran, DM on metformin, diabetic neuropathy on gabapentin, gout (recent flare in R fingers in June), HTN, HLD presents with fever, chills, redness, swelling, and pain from L elbow radiating down to L forearm and hand after sustaining abrasion while holding open elevator door 4 days ago. Patient states that he was at Dr. Arellano's office with already present L elbow swelling. He tried to keep elevator door open for kids and the door closed on his forearm leaving a cut. After this he started having increased pain, erythema, warmth, swelling from the cut all the way to the hand and the elbow. Patient had one episode of fever to 101. Patient went to urgent care for this but was sent to the ED. He endorses pain from the elbow down when trying to move LUE. Patient's most recent gout flare was in June 2024 in his RUE fingers, treated with ibuprofen. Patient had LLE cellulitis treated in [facility] 2 years ago but does not remember what antibiotics he was on. Denies sore throat, difficulty swallowing, coughing, abd pain, back pain, dysuria, chest pain, sob, current fever or chills, diarrhea, constipation,       IMPRESSION/RECOMMENDATIONS  Immunosuppression/Immunosenescence ( above age 60 yrs there is a exponential decline in immunity which could result in poor clinical outcomes.   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

## 2024-08-30 NOTE — ED PROVIDER NOTE - CLINICAL SUMMARY MEDICAL DECISION MAKING FREE TEXT BOX
Patient presents for evaluation of left forearm pain, redness and swelling.  Patient found to have cellulitis.  Required labs, antibiotics and management.  Patient with elevated inflammatory markers and Ortho to be consulted.  Patient will be admitted.

## 2024-08-30 NOTE — H&P ADULT - HISTORY OF PRESENT ILLNESS
84yoM with a PMH of prostate cancer, ALEKSANDER lawton on coumadin, DM, gout, HTN, HLD presents with fever, chills, redness, swelling, pruritis, and pain to the L elbow after sustaining abrasion while opening door 4 days ago. Patient went to urgent care for this but was sent to the ED. Denies sore throat, difficulty swallowing, coughing, abd pain, back pain, dysuria,     In the ED,  - Vital signs: /67, HR 97, RR 18, Temp 98.3F, SpO2 99% on RA  - Labs significant for: ESR 64 (inc), AG 15 (inc), .6 (inc), WBC 14.69 (inc), 83% neutrophils, Hb 12.1 (dec), glucose 161 (inc), INR 1.45  - Imaging: CXR- no acute consolidation, extensive b/l calcified pleural plaques  - EKG: a. fib, RBBB,   - Consults: Ortho- no acute intervention, WBAT LUE, Pain control as needed, Extremity icing/elevation to alleviate swelling, trend WBC/ESR/CRP  - S/p ceftriaxone IVPB 2000mg x1, metronidazole IVPB 500mg x1, acetaminophen 650mg PO x1, NS 500ml Bolus X1   84yoM with a PMH of prostate cancer on xtandi, A. fib on dabigatran, DM on metformin, diabetic neuropathy on gabapentin, gout (recent flare in R fingers in June), HTN, HLD presents with fever, chills, redness, swelling, and pain from L elbow radiating down to L forearm and hand after sustaining abrasion while holding open elevator door 4 days ago. Patient states that he was at Dr. Arellano's office with already present L elbow swelling. He tried to keep elevator door open for kids and the door closed on his forearm leaving a cut. After this he started having increased pain, erythema, warmth, swelling from the cut all the way to the hand and the elbow. Patient had one episode of fever to 101. Patient went to urgent care for this but was sent to the ED. He endorses pain from the elbow down when trying to move LUE. Patient's most recent gout flare was in June 2024 in his RUE fingers, treated with ibuprofen. Patient had LLE cellulitis treated in [facility] 2 years ago but does not remember what antibiotics he was on. Denies sore throat, difficulty swallowing, coughing, abd pain, back pain, dysuria, chest pain, sob, current fever or chills, diarrhea, constipation,     In the ED,  - Vital signs: /67, HR 97, RR 18, Temp 98.3F, SpO2 99% on RA  - Labs significant for: ESR 64 (inc), AG 15 (inc), .6 (inc), WBC 14.69 (inc), 83% neutrophils, Hb 12.1 (dec), glucose 161 (inc), INR 1.45  - Imaging: CXR- no acute consolidation, extensive b/l calcified pleural plaques  - EKG: a. fib, RBBB,   - Consults: Ortho- no acute intervention, WBAT LUE, Pain control as needed, Extremity icing/elevation to alleviate swelling, trend WBC/ESR/CRP  - S/p ceftriaxone IVPB 2000mg x1, metronidazole IVPB 500mg x1, acetaminophen 650mg PO x1, NS 500ml Bolus X1    Admitted for cellulitis of LUE.  84yoM with a PMH of prostate cancer on xtandi, A. fib on dabigatran, DM on metformin, diabetic neuropathy on gabapentin, gout (recent flare in R fingers in June), HTN, HLD presents with fever, chills, redness, swelling, and pain from L elbow radiating down to L forearm and hand after sustaining abrasion while holding open elevator door 4 days ago. Patient states that he was at Dr. Arellano's office with already present L elbow swelling. He tried to keep elevator door open for kids and the door closed on his forearm leaving a cut. After this he started having increased pain, erythema, warmth, swelling from the cut all the way to the hand and the elbow. Patient had one episode of fever to 101. Patient went to urgent care for this but was sent to the ED. He endorses pain from the elbow down when trying to move LUE. Patient's most recent gout flare was in June 2024 in his RUE fingers, treated with ibuprofen. Patient had LLE cellulitis treated in [facility] 2 years ago but does not remember what antibiotics he was on. Denies sore throat, difficulty swallowing, coughing, abd pain, back pain, dysuria, chest pain, sob, current fever or chills, diarrhea, constipation,     In the ED,  - Vital signs: /67, HR 97, RR 18, Temp 98.3F, SpO2 99% on RA  - Labs significant for: ESR 64 (inc), AG 15 (inc), .6 (inc), WBC 14.69 (inc), 83% neutrophils, Hb 12.1 (dec), glucose 161 (inc), INR 1.45  - Imaging: CXR- no acute consolidation, extensive b/l calcified pleural plaques; XR L elbow- Questionable elbow joint effusion. No definite fracture or osteomyelitis. No radiopaque foreign body.  - EKG: a. fib, RBBB,   - Consults: Ortho- no acute intervention, WBAT LUE, Pain control as needed, Extremity icing/elevation to alleviate swelling, trend WBC/ESR/CRP  - S/p ceftriaxone IVPB 2000mg x1, metronidazole IVPB 500mg x1, acetaminophen 650mg PO x1, NS 500ml Bolus X1    Admitted for cellulitis of LUE.

## 2024-08-30 NOTE — CONSULT NOTE ADULT - SUBJECTIVE AND OBJECTIVE BOX
ORTHOPAEDIC SURGERY CONSULT NOTE    Reason for Consult: concern for left septic elbow    HPI: 84yMale presents with pain, swelling, and erythema of the left forearm extending toward elbow x4 days as well as fever at home. States that he struck his forearm against an elevator door and sustained a small laceration. The lac scabbed over but he then developed increasing pain, swelling, and redness. No paresthesias. No pain elsewhere. Feels ROM of elbow is slightly limited. No other recent trauma or falls. Patient notes Hx of gout with most recent flare in right digits.      PAST MEDICAL & SURGICAL HISTORY:  Prostate CA  AF (atrial fibrillation)  Diabetes  HTN (hypertension)  HLD (hyperlipidemia)  Gout      Allergies: No Known Allergies      PHYSICAL EXAM:  Vital Signs Last 24 Hrs  T(C): 36.8 (30 Aug 2024 00:26), Max: 36.8 (30 Aug 2024 00:26)  T(F): 98.3 (30 Aug 2024 00:26), Max: 98.3 (30 Aug 2024 00:26)  HR: 97 (30 Aug 2024 00:26) (97 - 97)  BP: 123/67 (30 Aug 2024 00:26) (123/67 - 123/67)  BP(mean): --  RR: 18 (30 Aug 2024 00:26) (18 - 18)  SpO2: 99% (30 Aug 2024 00:26) (99% - 99%)    Parameters below as of 30 Aug 2024 00:26  Patient On (Oxygen Delivery Method): room air      Physical Exam:  Alert, NAD  Resp: NLB on RA.    LUE:  Scabbed approx 2.5cm laceration noted at distal dorsoulnar forearm  +swelling of forearm extending from wrist to elbow  +erythema of dorsum of forearm and hand  TTP at prior laceration and at medial epicondyle  NTTP shoulder, upper arm, olecranon , lateral epicondyle, forearm, wrist or hand  Limited ROM of elbow about 80 deg to 130, painful with further flexion  Full baseline painless ROM at shoulder, wrist, and   SILT in axillary, musculocutaneous, radial, median, and ulnar distributions.   AIN/PIN/U motor intact  2+ radial pulse with brisk cap refill at distal finger tips  Compartments soft and compressible      Labs:                        12.1   14.69 )-----------( 219      ( 30 Aug 2024 02:15 )             35.8     08-30    137  |  98  |  18  ----------------------------<  161<H>  3.5   |  24  |  0.8    Ca    9.5      30 Aug 2024 02:15    TPro  6.7  /  Alb  3.9  /  TBili  0.8  /  DBili  x   /  AST  12  /  ALT  6   /  AlkPhos  84  08-30    PT/INR - ( 30 Aug 2024 03:30 )   PT: 16.60 sec;   INR: 1.45 ratio      PTT - ( 30 Aug 2024 03:30 )  PTT:45.0 sec    ESR - 64  CRP - 116      Imaging:  XR left elbow show no fracture or dislocation      A/P: 84y Male with left forearm cellulitis x4 days. Low concern for septic elbow based on exam, however, will monitor throughout the day. Agree with plan to admit and treat with IV antibiotics.    - no acute intervention at this time, monitor exam  - WBAT LUE  - Pain control as needed  - Extremity icing/elevation to alleviate swelling  - trend WBC/ESR/CRP
  VENICE GILES  84y, Male  Allergy: No Known Allergies      All historical available data reviewed.    HPI:  84yoM with a PMH of prostate cancer on xtandi, A. fib on dabigatran, DM on metformin, diabetic neuropathy on gabapentin, gout (recent flare in R fingers in ), HTN, HLD presents with fever, chills, redness, swelling, and pain from L elbow radiating down to L forearm and hand after sustaining abrasion while holding open elevator door 4 days ago. Patient states that he was at Dr. Arellano's office with already present L elbow swelling. He tried to keep elevator door open for kids and the door closed on his forearm leaving a cut. After this he started having increased pain, erythema, warmth, swelling from the cut all the way to the hand and the elbow. Patient had one episode of fever to 101. Patient went to urgent care for this but was sent to the ED. He endorses pain from the elbow down when trying to move LUE. Patient's most recent gout flare was in 2024 in his RUE fingers, treated with ibuprofen. Patient had LLE cellulitis treated in [facility] 2 years ago but does not remember what antibiotics he was on. Denies sore throat, difficulty swallowing, coughing, abd pain, back pain, dysuria, chest pain, sob, current fever or chills, diarrhea, constipation,     In the ED,  - Vital signs: /67, HR 97, RR 18, Temp 98.3F, SpO2 99% on RA  - Labs significant for: ESR 64 (inc), AG 15 (inc), .6 (inc), WBC 14.69 (inc), 83% neutrophils, Hb 12.1 (dec), glucose 161 (inc), INR 1.45  - Imaging: CXR- no acute consolidation, extensive b/l calcified pleural plaques; XR L elbow- Questionable elbow joint effusion. No definite fracture or osteomyelitis. No radiopaque foreign body.  - EKG: a. fib, RBBB,   - Consults: Ortho- no acute intervention, WBAT LUE, Pain control as needed, Extremity icing/elevation to alleviate swelling, trend WBC/ESR/CRP  - S/p ceftriaxone IVPB 2000mg x1, metronidazole IVPB 500mg x1, acetaminophen 650mg PO x1, NS 500ml Bolus X1    Admitted for cellulitis of LUE.  (30 Aug 2024 08:56)    FAMILY HISTORY:  Family history of cardiac disorder in sister (Father, Mother, Sibling)    Family history of throat cancer      PAST MEDICAL & SURGICAL HISTORY:  Prostate CA      AF (atrial fibrillation)      Diabetes      HTN (hypertension)      HLD (hyperlipidemia)            VITALS:  T(F): 98.8, Max: 98.8 (08-30-24 @ 07:46)  HR: 81  BP: 116/71  RR: 18Vital Signs Last 24 Hrs  T(C): 37.1 (30 Aug 2024 07:46), Max: 37.1 (30 Aug 2024 07:46)  T(F): 98.8 (30 Aug 2024 07:46), Max: 98.8 (30 Aug 2024 07:46)  HR: 81 (30 Aug 2024 07:46) (81 - 97)  BP: 116/71 (30 Aug 2024 07:46) (116/71 - 123/67)  BP(mean): --  RR: 18 (30 Aug 2024 07:46) (18 - 18)  SpO2: 98% (30 Aug 2024 07:46) (98% - 99%)    Parameters below as of 30 Aug 2024 07:46  Patient On (Oxygen Delivery Method): room air        TESTS & MEASUREMENTS:                        12.1   14.69 )-----------( 219      ( 30 Aug 2024 02:15 )             35.8     08-30    137  |  98  |  18  ----------------------------<  161<H>  3.5   |  24  |  0.8    Ca    9.5      30 Aug 2024 02:15    TPro  6.7  /  Alb  3.9  /  TBili  0.8  /  DBili  x   /  AST  12  /  ALT  6   /  AlkPhos  84  08-30    LIVER FUNCTIONS - ( 30 Aug 2024 02:15 )  Alb: 3.9 g/dL / Pro: 6.7 g/dL / ALK PHOS: 84 U/L / ALT: 6 U/L / AST: 12 U/L / GGT: x             Urinalysis Basic - ( 30 Aug 2024 08:40 )    Color: Yellow / Appearance: Clear / S.011 / pH: x  Gluc: x / Ketone: Negative mg/dL  / Bili: Negative / Urobili: 0.2 mg/dL   Blood: x / Protein: Negative mg/dL / Nitrite: Negative   Leuk Esterase: Negative / RBC: x / WBC x   Sq Epi: x / Non Sq Epi: x / Bacteria: x          RADIOLOGY & ADDITIONAL TESTS:  Personal review of radiological diagnostics performed  Echo and EKG results noted when applicable.     MEDICATIONS:  acetaminophen     Tablet .. 650 milliGRAM(s) Oral every 6 hours PRN  dextrose 5%. 1000 milliLiter(s) IV Continuous <Continuous>  dextrose 5%. 1000 milliLiter(s) IV Continuous <Continuous>  dextrose 50% Injectable 12.5 Gram(s) IV Push once  dextrose 50% Injectable 25 Gram(s) IV Push once  dextrose 50% Injectable 25 Gram(s) IV Push once  dextrose Oral Gel 15 Gram(s) Oral once PRN  glucagon  Injectable 1 milliGRAM(s) IntraMuscular once  insulin lispro (ADMELOG) corrective regimen sliding scale   SubCutaneous three times a day before meals  insulin lispro Injectable (ADMELOG) 5.5 Unit(s) SubCutaneous three times a day before meals  losartan 100 milliGRAM(s) Oral daily  metoprolol succinate ER 25 milliGRAM(s) Oral daily  NIFEdipine XL 60 milliGRAM(s) Oral daily  predniSONE   Tablet 40 milliGRAM(s) Oral daily      ANTIBIOTICS:

## 2024-08-30 NOTE — ED ADULT NURSE NOTE - AVIAN FLU EXPOSURE
Pt. with kidney transplant and on immunosuppressive meds.  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

## 2024-08-30 NOTE — H&P ADULT - NSHPREVIEWOFSYSTEMS_GEN_ALL_CORE
REVIEW OF SYSTEMS:    CONSTITUTIONAL: No fevers or chills  EYES/ENT: No visual changes;  No throat pain   NECK: No pain or stiffness  RESPIRATORY: No cough, wheezing, hemoptysis; No shortness of breath  CARDIOVASCULAR: No chest pain or palpitations  GASTROINTESTINAL: No abdominal pain. No nausea, vomiting; No diarrhea or constipation. No hematochezia.  GENITOURINARY: No dysuria or hematuria  NEUROLOGICAL: No numbness, Present pain in LUE from elbow down gume with movement  SKIN: Erythema, warmth, swelling in LUE from elbow down

## 2024-08-30 NOTE — ED PROVIDER NOTE - OBJECTIVE STATEMENT
84 yold male to ED Pmhx Prostate Ca, AFib on coumadin, Dm, Gout; pt c/o fever, chills, redness, swelling and pain to left elbow; pt sustained abrasion/suprefical lac 4 days ago while opening door and it got infected; pt denies sore throat, difficulty swallowing, coughing, abdominal pain, back pain, or urinary sx;

## 2024-08-30 NOTE — H&P ADULT - ASSESSMENT
84yoM with a PMH of prostate cancer on xtandi, A. fib on dabigatran, DM on metformin, diabetic neuropathy on gabapentin, gout (recent flare in R fingers in June), HTN, HLD presents with fever, chills, redness, swelling, and pain from L elbow radiating down to L forearm and hand after sustaining abrasion while holding open elevator door 4 days ago. Admitted for cellulitis of LUE.    #Cellulitis of LUE  - Erythema, pain, edema, warmth extending from L hand to L elbow w/ area of laceration on L anterior forearm  - Vital signs wnl on admission  - 1 ep of fever last night to 101  - Afebrile on admission  - XR L elbow and forearm- Questionable elbow joint effusion. No definite fracture or osteomyelitis. No radiopaque foreign body.  - WBC inc w/ left shift, ESR inc, CRP inc on admission  - S/p ceftriaxone and metronidazole in ED  - Ortho recs appreciated:       - No acute intervention  - F/u BCx  - Started vancomycin IVPB 1000mg  - Ordered ID consult  - Started PRN tylenol for pain control    #Gout  - Recent flare in RUE fingers in June 2024  - LUE elbow already edematous prior to injury  - Kidney function wnl on admission  - F/u uric acid level    #Prostate CA  - On xtandi  - Follows with Dr. Marsh    #A.fib   - on dabigatran 300qd at home  - EKG: a. fib, RBBB,  on admission  - Follows with Dr. Mccarthy  - INR inc on admission  - C/w metoprolol  - C/w dabigatran 150 BID  - Ordered cardio c/s    #Diabetes  - On metformin at home- held  - F/u A1c  - Started ISS    #HTN  #HLD  - C/w home meds    #MISC  - DVT ppx: dabigatran   - GI ppx: not needed  - Diet: DASH w/ CC  - Code: FULL    Pending: Bcx, A1C, ID c/s, cardio c/s, uric acid level

## 2024-08-30 NOTE — H&P ADULT - IN ACCORDANCE WITH [place] STATE LAW, WE OFFER EVERY PATIENT A HEPATITIS C TEST. WOULD YOU LIKE TO BE TESTED TODAY?
"[facility]   Mabel Sandra, MANDA CNP  04/05/2018      Chief Complaint:   Physical; Refill Request; and Orders       History of Present Illness:   Sherrell Kothari is a 58 year old female with a history of depression, anxiety, hypothyroidism, hypertension, pure hypercholesterolemia, and obstructive sleep apnea who presents for a physical exam. Sherrell reports that she has been unemployed as of 2/2016. She feels that it is time to get a physical and to start thinking about working again. She has been spending time with her father-in-law as he falls a lot and needs to go to doctor's appointments every two weeks. She also takes him grocery shopping and helps him at home with life activities. 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 She does want to start temporary work so she can vacation a couple of times a year.     She reports increased anxiety as well as weight gain and feels that this could be improved if she got out more and moved around more. She feels as if she is addicted to \"sugar and carbs.\" She wants to drop some weight and get back to regular exercise. Her pet passed away last June, but she has been working as a  and enjoys this, but she is a worrier and carries pepper spray with her wherever she goes. She is requesting a renewal of her prescription for Xanax. She is currently on Zoloft 100 mg daily. She notes that her mother has had side effects from this medication, so she is concerned that she herself could be developing side effects without knowing. She cannot tell the difference in her mentation when she changes the Zoloft doses, but reports that she feels the Zoloft is helping her mood and anxiety somewhat.      Regarding her sleep apnea management, Sherrell reports that she requested a new CPAP (face mask with humidifier) but the [facility] system said they sent in paperwork to her primary clinic and just " Opt out has not gotten information back as of yesterday. Using the CPAP, she reports that he sleeping has improved drastically. She notes that her sleep apnea was diagnosed by [facility] and the sleep test was performed at [facility].    Her blood pressure is elevated today at 143/92 mmHg. She is currently not on medication for blood pressure management. At her last visit with Dr. Velasquez on 2/9/2017, her BP was 125/81 mmHg. She denies any chest pain, diaphoresis, or shortness of breath.     Medication list was reviewed and updates were made as needed.     Health Maintenance:  Mammogram (females age 40 - 75): yearly or every 2 years? - Recommended, order placed today.  Pap (females age 21 - 65): every 3 years, every 5 years after age 35 if cotesting done - done on 6/9/2015 and was normal  Colon Cancer screening (age 50 - 75): yearly FIT or colonoscopy every 5 - 10 years - Recommended colonoscopy  Glucose: every 5 years, yearly if risk factors? - Recommended  Lipid panel: every 5 years, yearly if risk factors - Recommended  Hepatitis C (adults born 1945 - 1965): once per lifetime - done on 11/15/2016 and was non-reactive   Immunizations (Tetanus every 10 years, Influenza yearly, Pneumonia PPV-23 and PCV-13 age ? 65 or sooner if risk factors) - Up to Date   Immunization History   Administered Date(s) Administered     DTAP (<7y) (Quadracel) 10/08/1984, 09/09/1986     Influenza (IIV3) PF 11/15/2005, 11/15/2007, 09/15/2014     Poliovirus, inactivated (IPV) 11/16/1987     TD (ADULT, 7+) 03/16/2005     TDAP Vaccine (Boostrix) 04/02/2015        The following health maintenance issues were also reviewed and addressed as needed:  Blood pressure (>18 years annually)  Lifestyle habits (including exercise, diet, weight)  Health risk behaviors (sexual history, alcohol, tobacco, drug use, partner violence)  Routine eye, dental, hearing, and skin exams  Advanced directives     Review of Systems:   Pertinent  items are noted in HPI, remainder of complete ROS is negative.      Active Medications:   Current Outpatient Prescriptions:      sertraline (ZOLOFT) 50 MG tablet, Take 1.5 tablets (75 mg) by mouth daily, Disp: 135 tablet, Rfl: 3     levothyroxine (SYNTHROID/LEVOTHROID) 75 MCG tablet, Take 1 tablet (75 mcg) by mouth daily, Disp: 90 tablet, Rfl: 0     estradiol (ESTRACE VAGINAL) 0.1 MG/GM cream, Place 2 g vaginally twice a week, Disp: 42.5 g, Rfl: 0     Loratadine (CLARITIN PO), Take by mouth daily, Disp: , Rfl:      Omeprazole Magnesium (PRILOSEC OTC PO), Take by mouth as needed , Disp: , Rfl:      MAGNESIUM PO, Take 1,000 mg by mouth daily, Disp: , Rfl:      fexofenadine (ALLEGRA) 180 MG tablet, Take by mouth as needed Reported on 5/9/2017, Disp: , Rfl:      rizatriptan (MAXALT-MLT) 10 MG disintegrating tablet, Take 1 tablet by mouth at onset of headache for migraine (Repeat at two hours if headache not entirely abated). May repeat dose in 2 hours.  Do not exceed 30 mg in 24 hours, Disp: 12 tablet, Rfl: 6     [DISCONTINUED] sertraline (ZOLOFT) 100 MG tablet, Take 1 tablet (100 mg) by mouth daily (Patient taking differently: Take 50 mg by mouth daily ), Disp: 90 tablet, Rfl: 0     ALPRAZolam (XANAX) 0.25 MG tablet, Take 1 tablet (0.25 mg) by mouth 3 times daily as needed for anxiety (Patient not taking: Reported on 5/9/2017), Disp: 10 tablet, Rfl: 0      Allergies:   Latex  Dust mites  Mold  Pollen extract  Sulfa drugs      Past Medical History:  Allergic rhinitis  Chronic nasal congestion  Dyspnea and respiratory abnormality   Obstructive sleep apnea  Hypersomnia with sleep apnea - on CPAP  Essential hypertension, benign  Mixed hyperlipidemia   Pure hypercholesterolemia   Other chest pain - non-cardiac, cardiology, nl stress test  Squamous cell carcinoma, upper lip - treated with MOHS  Xerosis cutis  Depressive disorder  Anxiety state  Helicobacter pylori - treated  Tubulovillous adenoma colon   Diaphragmatic hernia  "without mention of obstruction or gangrene - hiatal hernia  Other forms of migraine, without mention of intractable migraine without mention of status migrainosus; intermittent, formerly treated with Zomig  Syncope - nml echo, head CT  Dizziness and giddiness  Temporomandibular disorder  Bruxism  Hypothyroidism due to acquired atrophy of thyroid  Obesity   Abnormal glucose  Chondromalacia of patella     Past Surgical History:  Biopsy of skin lesion  C echo heart xthoracic, complete, w/o doppler - EF 60%  HC esophagoscopy, diagnostic - hiatal hernia  HC hysteroscopy, surgical; w/ endometrial ablation, any method  Tonsillectomy   Hysteroscopy with endometrial ablation  MOHS micrographic procedure  Pelvis laparoscopy, DX - laparoscopy, benign fatty tumor LLQ  Bilateral blepharoplasty   Bilateral breast reduction     Family History:   Mother - CAD, mild heart attack at age 59, colon polyps, thyroid disease  Maternal grandmother - diabetes, colorectal cancer, thyroid disease, skin cancer  Maternal grandfather - leukemia  Father - alcohol/drug  Brother, younger - colorectal cancer, diagnosed 2013  Maternal family history of colon polyps     Social History:   Marital Status:  to Jaziel  Presents to the clinic alone.  Patient previously worked as an Ad. Assistant for the [employer]  Tobacco Use: former smoker, 0.2 PPD for 5 years, cigarettes, quit at age 18  Alcohol Use: yes  PCP: Mabel Sandra      Physical Exam:   Patient Vitals for the past 24 hrs:   BP Pulse Height Weight   04/05/18 1337 (!) 157/98 - - -   04/05/18 1225 (!) 143/92 90 - -   04/05/18 1217 (!) 133/91 92 1.6 m (5' 3\") 101.2 kg (223 lb)        Wt Readings from Last 1 Encounters:   04/05/18 101.2 kg (223 lb)     Constitutional: no distress, comfortable, pleasant   Eyes: anicteric, conjunctiva pink, normal extra-ocular movements   Ears, Nose and Throat: tympanic membranes clear, nose clear and free of lesions, throat clear. "   Neck: supple with full range of motion, no thyromegaly.   Cardiovascular: regular rate and rhythm, normal S1 and S2, no murmurs, rubs or gallops, peripheral pulses full and symmetric   Respiratory: clear to auscultation, no wheezes or crackles, normal breath sounds   Breasts: Nl to exam w/o masses.  Gastrointestinal: positive bowel sounds, nontender, no hepatosplenomegaly, no masses   Musculoskeletal: full range of motion, no edema   Skin: no concerning lesions, no jaundice, temp normal   Neurological:  normal gait, normal speech, no tremor. A and O x 3, good historian.  Psychological: appropriate mood, good eye contact, normal affect   Lymph: no axillary, cervical,  supraclavicular, infraclavicular or inguinal nodes.       Assessment and Plan:  Visit for screening mammogram  - MA SCREENING DIGITAL BILAT - Future  (s+30)    Special screening for malignant neoplasms, colon  Colonoscopy ordered  - GASTROENTEROLOGY ADULT REF PROCEDURE ONLY    Adjustment disorder with mixed anxiety and depressed mood  Refills provided for Zoloft. Recommended she try decreasing from 100 mg daily down to 75 mg daily (1 1/2 tablets of Zoloft 50 mg tablets) and see if she feels any different. She can increase back to 100 mg daily if needed.   - sertraline (ZOLOFT) 50 MG tablet  Dispense: 135 tablet; Refill: 3    Pure hypercholesterolemia  Laboratory studies reviewed and orders placed as needed. She is not fasting today, so will come back for fasting labs.   - Comprehensive metabolic panel  - TSH  - Hemoglobin A1c  - Lipid panel reflex to direct LDL Fasting  - CBC with platelets  - UA with Micro reflex to Culture    Tubulovillous adenoma colon   Colonoscopy ordered.    LORELEI (obstructive sleep apnea)  Orders placed for CPAP equipment  - order for DME  Dispense: 1 unit marking on an U-100 insulin syringe; Refill: 1    Essential hypertension  BP is elevated. Will start Diovan 160 mg daily. Discussed that progressive aerobic exercise and weight  loss can help control blood pressure. Will recheck in 4 weeks, at which time will discuss her plans for regular exercise/weight loss.  - valsartan (DIOVAN) 160 MG tablet  Dispense: 90 tablet; Refill: 3        Follow-up: Return in about 4 weeks (around 5/3/2018).         Scribe Disclosure:   I, Elham Bhagat, am serving as a scribe to document services personally performed by MANDA Yanez CNP at this visit, based upon the provider's statements to me. All documentation has been reviewed by the aforementioned provider prior to being entered into the official medical record.     Portions of this medical record were completed by a scribe. UPON MY REVIEW AND AUTHENTICATION BY ELECTRONIC SIGNATURE, this confirms (a) I performed the applicable clinical services, and (b) the record is accurate.  Total time spent 40 minutes.  More than 50% of the time spent with Ms. Kothari on counseling / coordinating her care.  Mabel HOLMAN, CHANELLE

## 2024-08-30 NOTE — CONSULT NOTE ADULT - TIME BILLING
-I reviewed prior external available medical records  -I recommended ordering the unique tests to make the diagnosis of an infection  -I reviewed the completed testing   -My assessment required an independent historian and is independent of the teaching service  -I independently interpreted the most recent imaging ( CXR )  -I discussed my recommendations with the primary team housestaff/Attending  -I assisted with prescription drug management ( discharge antibiotics )

## 2024-08-30 NOTE — H&P ADULT - ATTENDING COMMENTS
83yo M with a PMH of prostate cancer, Chronic  A. fib, T2DM, diabetic neuropathy, gout, HTN, HLD presents with fever, chills, redness, swelling, and pain from L elbow radiating down to L forearm and hand after sustaining abrasion while holding open elevator door for children 4 days ago. Received ceftriaxone/metronidazole in ED.  Was febrile at home to 101.      T(F): 98.8 (30 Aug 2024 07:46), Max: 98.8 (30 Aug 2024 07:46)  HR: 81 (30 Aug 2024 07:46) (81 - 97)  BP: 116/71 (30 Aug 2024 07:46) (116/71 - 123/67)  RR: 18 (30 Aug 2024 07:46) (18 - 18)  SpO2: 98% (30 Aug 2024 07:46)     cta b/l  irrr s1s2  left upper extremity with extensive erythema, calor from elbow to wrist, right upper extremity essentially normal, cool to touch    labs/rads reviewed    a/p    Cellulitis of LUE but had tachycardia (HR >90), febrile, and elevated WBC with infection source appears to meet sepsis criteria  - Ortho recs appreciated:       - No acute intervention  - F/u BCx  - Started vancomycin IVPB 1000mg  - Ordered ID consult  - Started PRN tylenol for pain control    #Gout  - Recent flare in RUE fingers in June 2024  - F/u uric acid level    #Prostate CA  - On xtandi  - Follows with Dr. 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

## 2024-08-30 NOTE — ED PROVIDER NOTE - ATTENDING APP SHARED VISIT CONTRIBUTION OF CARE
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  Patient also reports having a fever and went to urgent care and was sent to the emergency room.  Patient reports getting a scratch on his left forearm while opening a door.  VSS, non toxic appearing, NAD, Head NCAT, MMM, neck supple, normal ROM, normal s1s2, lungs ctab, abd s/nt/nd, no guarding or rebound, extremities edema, erythema and tenderness to palpation of the left forearm circumferentially, neurovascularly intact, painful range of motion of the left elbow, AAO x 3, GCS 15, neuro grossly normal. No acute skin lesions. Plan is labs, imaging, antibiotics and likely admit.

## 2024-08-30 NOTE — ED ADULT NURSE NOTE - NSFALLHARMRISKINTERV_ED_ALL_ED

## 2024-08-31 LAB
A1C WITH ESTIMATED AVERAGE GLUCOSE RESULT: 5 % — SIGNIFICANT CHANGE UP (ref 4–5.6)
ANION GAP SERPL CALC-SCNC: 12 MMOL/L — SIGNIFICANT CHANGE UP (ref 7–14)
APTT BLD: 48.9 SEC — HIGH (ref 27–39.2)
BASOPHILS # BLD AUTO: 0.01 K/UL — SIGNIFICANT CHANGE UP (ref 0–0.2)
BASOPHILS NFR BLD AUTO: 0.1 % — SIGNIFICANT CHANGE UP (ref 0–1)
BUN SERPL-MCNC: 12 MG/DL — SIGNIFICANT CHANGE UP (ref 10–20)
CALCIUM SERPL-MCNC: 9.4 MG/DL — SIGNIFICANT CHANGE UP (ref 8.4–10.5)
CHLORIDE SERPL-SCNC: 104 MMOL/L — SIGNIFICANT CHANGE UP (ref 98–110)
CO2 SERPL-SCNC: 25 MMOL/L — SIGNIFICANT CHANGE UP (ref 17–32)
CREAT SERPL-MCNC: 0.7 MG/DL — SIGNIFICANT CHANGE UP (ref 0.7–1.5)
EGFR: 91 ML/MIN/1.73M2 — SIGNIFICANT CHANGE UP
EOSINOPHIL # BLD AUTO: 0 K/UL — SIGNIFICANT CHANGE UP (ref 0–0.7)
EOSINOPHIL NFR BLD AUTO: 0 % — SIGNIFICANT CHANGE UP (ref 0–8)
ESTIMATED AVERAGE GLUCOSE: 97 MG/DL — SIGNIFICANT CHANGE UP (ref 68–114)
GLUCOSE BLDC GLUCOMTR-MCNC: 141 MG/DL — HIGH (ref 70–99)
GLUCOSE BLDC GLUCOMTR-MCNC: 145 MG/DL — HIGH (ref 70–99)
GLUCOSE BLDC GLUCOMTR-MCNC: 158 MG/DL — HIGH (ref 70–99)
GLUCOSE BLDC GLUCOMTR-MCNC: 197 MG/DL — HIGH (ref 70–99)
GLUCOSE SERPL-MCNC: 142 MG/DL — HIGH (ref 70–99)
HCT VFR BLD CALC: 33.4 % — LOW (ref 42–52)
HGB BLD-MCNC: 11.5 G/DL — LOW (ref 14–18)
IMM GRANULOCYTES NFR BLD AUTO: 0.6 % — HIGH (ref 0.1–0.3)
INR BLD: 1.45 RATIO — HIGH (ref 0.65–1.3)
LYMPHOCYTES # BLD AUTO: 0.7 K/UL — LOW (ref 1.2–3.4)
LYMPHOCYTES # BLD AUTO: 8.1 % — LOW (ref 20.5–51.1)
MAGNESIUM SERPL-MCNC: 1.6 MG/DL — LOW (ref 1.8–2.4)
MCHC RBC-ENTMCNC: 32 PG — HIGH (ref 27–31)
MCHC RBC-ENTMCNC: 34.4 G/DL — SIGNIFICANT CHANGE UP (ref 32–37)
MCV RBC AUTO: 93 FL — SIGNIFICANT CHANGE UP (ref 80–94)
MONOCYTES # BLD AUTO: 0.61 K/UL — HIGH (ref 0.1–0.6)
MONOCYTES NFR BLD AUTO: 7 % — SIGNIFICANT CHANGE UP (ref 1.7–9.3)
NEUTROPHILS # BLD AUTO: 7.32 K/UL — HIGH (ref 1.4–6.5)
NEUTROPHILS NFR BLD AUTO: 84.2 % — HIGH (ref 42.2–75.2)
NRBC # BLD: 0 /100 WBCS — SIGNIFICANT CHANGE UP (ref 0–0)
PHOSPHATE SERPL-MCNC: 3 MG/DL — SIGNIFICANT CHANGE UP (ref 2.1–4.9)
PLATELET # BLD AUTO: 172 K/UL — SIGNIFICANT CHANGE UP (ref 130–400)
PMV BLD: 10.7 FL — HIGH (ref 7.4–10.4)
POTASSIUM SERPL-MCNC: 3.6 MMOL/L — SIGNIFICANT CHANGE UP (ref 3.5–5)
POTASSIUM SERPL-SCNC: 3.6 MMOL/L — SIGNIFICANT CHANGE UP (ref 3.5–5)
PROTHROM AB SERPL-ACNC: 16.6 SEC — HIGH (ref 9.95–12.87)
RBC # BLD: 3.59 M/UL — LOW (ref 4.7–6.1)
RBC # FLD: 13.5 % — SIGNIFICANT CHANGE UP (ref 11.5–14.5)
SODIUM SERPL-SCNC: 141 MMOL/L — SIGNIFICANT CHANGE UP (ref 135–146)
URATE SERPL-MCNC: 4.9 MG/DL — SIGNIFICANT CHANGE UP (ref 3.4–8.8)
WBC # BLD: 8.69 K/UL — SIGNIFICANT CHANGE UP (ref 4.8–10.8)
WBC # FLD AUTO: 8.69 K/UL — SIGNIFICANT CHANGE UP (ref 4.8–10.8)

## 2024-08-31 PROCEDURE — 99232 SBSQ HOSP IP/OBS MODERATE 35: CPT

## 2024-08-31 RX ADMIN — METOPROLOL TARTRATE 25 MILLIGRAM(S): 100 TABLET ORAL at 05:18

## 2024-08-31 RX ADMIN — DABIGATRAN ETEXILATE MESYLATE 150 MILLIGRAM(S): 40 PELLET ORAL at 17:27

## 2024-08-31 RX ADMIN — CEFAZOLIN SODIUM 50 MILLIGRAM(S): 2 INJECTION, SOLUTION INTRAVENOUS at 14:28

## 2024-08-31 RX ADMIN — CEFAZOLIN SODIUM 50 MILLIGRAM(S): 2 INJECTION, SOLUTION INTRAVENOUS at 22:59

## 2024-08-31 RX ADMIN — Medication 5.5 UNIT(S): at 07:56

## 2024-08-31 RX ADMIN — Medication 1: at 12:09

## 2024-08-31 RX ADMIN — ENZALUTAMIDE 160 MILLIGRAM(S): 40 TABLET ORAL at 12:10

## 2024-08-31 RX ADMIN — Medication 5.5 UNIT(S): at 12:09

## 2024-08-31 RX ADMIN — LOSARTAN POTASSIUM 100 MILLIGRAM(S): 50 TABLET ORAL at 05:18

## 2024-08-31 RX ADMIN — NIFEDIPINE 30 MILLIGRAM(S): 60 TABLET, FILM COATED, EXTENDED RELEASE ORAL at 17:28

## 2024-08-31 RX ADMIN — CEFAZOLIN SODIUM 50 MILLIGRAM(S): 2 INJECTION, SOLUTION INTRAVENOUS at 05:16

## 2024-08-31 RX ADMIN — Medication 25 GRAM(S): at 17:29

## 2024-08-31 RX ADMIN — Medication 40 MILLIGRAM(S): at 05:17

## 2024-08-31 RX ADMIN — Medication 5.5 UNIT(S): at 17:16

## 2024-08-31 RX ADMIN — DABIGATRAN ETEXILATE MESYLATE 150 MILLIGRAM(S): 40 PELLET ORAL at 05:18

## 2024-08-31 RX ADMIN — Medication 1: at 07:57

## 2024-08-31 RX ADMIN — NIFEDIPINE 30 MILLIGRAM(S): 60 TABLET, FILM COATED, EXTENDED RELEASE ORAL at 05:18

## 2024-08-31 NOTE — DISCHARGE NOTE PROVIDER - PROVIDER TOKENS
PROVIDER:[TOKEN:[[id number]],FOLLOWUP:[2 weeks],ESTABLISHEDPATIENT:[T]] PROVIDER:[TOKEN:[[id number]],FOLLOWUP:[2 weeks],ESTABLISHEDPATIENT:[T]],PROVIDER:[TOKEN:[[id number]],FOLLOWUP:[2 weeks],ESTABLISHEDPATIENT:[T]] PROVIDER:[TOKEN:[[id number]],FOLLOWUP:[2 weeks],ESTABLISHEDPATIENT:[T]],PROVIDER:[TOKEN:[[id number]],FOLLOWUP:[2 weeks],ESTABLISHEDPATIENT:[T]],PROVIDER:[TOKEN:[[id number]],FOLLOWUP:[2 weeks]]

## 2024-08-31 NOTE — DISCHARGE NOTE PROVIDER - NSDCFUSCHEDAPPT_GEN_ALL_CORE_FT
Osvaldo Vee  [facility] PreAdmits  Scheduled Appointment: 09/06/2024    Osvaldo Vee  [facility]  [facility]  [address]  Scheduled Appointment: 09/06/2024    Kareem Marsh  [facility] PreAdmits  Scheduled Appointment: 09/11/2024    Kareem Marsh  [facility]  [facility]  [address]  Scheduled Appointment: 09/11/2024

## 2024-08-31 NOTE — PROGRESS NOTE ADULT - ASSESSMENT
swelling, pain left elbow lower forearm - ?cellulitis vs. abrasian vs. gout   atrial fibrillation   elevated BS - type 2 DM    Plan:  IV cefazolin/po prednisone  today day # 2 prednisone will give 5 days - uric acid 4.9 so doubt gout  patient on DOAC for a fib  elevated BS likely exacerbated by steroids - on home on metformin - HbA1C 5 so ? if he even needs diabetes medication  possible DC tomorrow on oral cephalexin for 7 days

## 2024-08-31 NOTE — DISCHARGE NOTE PROVIDER - CARE PROVIDERS DIRECT ADDRESSES
,[email or web address]

## 2024-08-31 NOTE — PATIENT PROFILE ADULT - FALL HARM RISK - HARM RISK INTERVENTIONS

## 2024-08-31 NOTE — DISCHARGE NOTE PROVIDER - ATTENDING DISCHARGE PHYSICAL EXAMINATION:
patient seen and examined independently on morning rounds, chart reviewed and discussed with medicine resident and agree with the above discharge note with the following addendum:    time spendt on discharge >30 minutes including coordination of discharge care    discharge home today- plan for f/u with outpatient pcp and rheum- continue with 4 more days of oral keflex 500 mg po q8hr-

## 2024-08-31 NOTE — DISCHARGE NOTE PROVIDER - DISCHARGE DATE
Initiate Treatment: Apply over the counter hydrocortisone to the affected area once daily for flares Walk in 31-Aug-2024 Detail Level: Simple 02-Sep-2024 03-Sep-2024

## 2024-08-31 NOTE — DISCHARGE NOTE PROVIDER - NSDCCPCAREPLAN_GEN_ALL_CORE_FT
PRINCIPAL DISCHARGE DIAGNOSIS  Diagnosis: Cellulitis of elbow  Assessment and Plan of Treatment: You were admiited for possible cellulitis and gout. Orthopedica nd ID team was consulted and recommendations followed. You were treated with IV Antibiotics and PO Prednisone. You are being discharged on PO antibiotics. Take the prescribed antibiotic medicine you are given as directed until it is gone. Take it even if you feel better. It treats the infection and stops it from returning. Not taking all the medicine can make future infections hard to treat. Keep the infected area clean. When possible, raise the infected area above the level of your heart. This helps keep swelling down. Apply clean bandages as advised. Wash your hands often to prevent spreading the infection. In the future, wash your hands before and after you touch cuts, scratches, or bandages. This will help prevent infection.   Call your doctor or returnt o the emergency room or call 911 immediately if you have any of the following: Difficulty or pain when moving the joints above or below the infected area, Discharge or pus draining from the area, rever of 100.4°F (38°C) or higher, or as directed by your healthcare provider, pain that gets worse in or around the infected site, redness that gets worse in or around the infected area, particularly if the area of redness expands to a wider area, shaking chills, swelling of the infected area, vomiting.       PRINCIPAL DISCHARGE DIAGNOSIS  Diagnosis: Cellulitis of elbow  Assessment and Plan of Treatment: You were admiited for possible cellulitis and gout. Orthopedics nd ID team was consulted and recommendations followed. You were treated with IV Antibiotics(Cefazolin) and PO Prednisone. You are being discharged on PO antibiotics(Cefalexin). Take the prescribed antibiotic medicine you are given as directed until it is gone. Take it even if you feel better. It treats the infection and stops it from returning. Not taking all the medicine can make future infections hard to treat. Keep the infected area clean. When possible, raise the infected area above the level of your heart. This helps keep swelling down. Apply clean bandages as advised. Wash your hands often to prevent spreading the infection. In the future, wash your hands before and after you touch cuts, scratches, or bandages. This will help prevent infection.   Call your doctor or returnt o the emergency room or call 911 immediately if you have any of the following: Difficulty or pain when moving the joints above or below the infected area, Discharge or pus draining from the area, rever of 100.4°F (38°C) or higher, or as directed by your healthcare provider, pain that gets worse in or around the infected site, redness that gets worse in or around the infected area, particularly if the area of redness expands to a wider area, shaking chills, swelling of the infected area, vomiting.

## 2024-08-31 NOTE — DISCHARGE NOTE PROVIDER - NSDCMRMEDTOKEN_GEN_ALL_CORE_FT
cyanocobalamin: 1 tab(s) orally once a day  dabigatran 150 mg oral capsule: 1 cap(s) orally 2 times a day  enzalutamide: 160 milligram(s)  folic acid: 1 tab(s) orally once a day  gabapentin 600 mg oral tablet: 2 cap(s) orally 2 times a day  losartan 100 mg oral tablet: 1 tab(s) orally once a day  metFORMIN 500 mg oral tablet: 1 tab(s) orally 2 times a day  metoprolol succinate 25 mg oral tablet, extended release: 1 tab(s) orally once a day  NIFEdipine 30 mg oral tablet, extended release: 1 tab(s) orally 2 times a day  thiamine: 1 tab(s) orally once a day   cephalexin 500 mg oral tablet: 1 tab(s) orally 4 times a day  cyanocobalamin: 1 tab(s) orally once a day  dabigatran 150 mg oral capsule: 1 cap(s) orally 2 times a day  enzalutamide: 160 milligram(s)  folic acid: 1 tab(s) orally once a day  gabapentin 600 mg oral tablet: 2 cap(s) orally 2 times a day  losartan 100 mg oral tablet: 1 tab(s) orally once a day  metFORMIN 500 mg oral tablet: 1 tab(s) orally 2 times a day  metoprolol succinate 25 mg oral tablet, extended release: 1 tab(s) orally once a day  NIFEdipine 30 mg oral tablet, extended release: 1 tab(s) orally 2 times a day  predniSONE 20 mg oral tablet: 2 tab(s) orally once a day  thiamine: 1 tab(s) orally once a day   cephalexin 500 mg oral tablet: 1 tab(s) orally every 8 hours  cyanocobalamin: 1 tab(s) orally once a day  dabigatran 150 mg oral capsule: 1 cap(s) orally 2 times a day  enzalutamide: 160 milligram(s)  folic acid: 1 tab(s) orally once a day  gabapentin 600 mg oral tablet: 2 cap(s) orally 2 times a day  losartan 100 mg oral tablet: 1 tab(s) orally once a day  metFORMIN 500 mg oral tablet: 1 tab(s) orally 2 times a day  metoprolol succinate 25 mg oral tablet, extended release: 1 tab(s) orally once a day  NIFEdipine 30 mg oral tablet, extended release: 1 tab(s) orally 2 times a day  predniSONE 20 mg oral tablet: 2 tab(s) orally once a day  thiamine: 1 tab(s) orally once a day

## 2024-08-31 NOTE — DISCHARGE NOTE PROVIDER - NSDCFUADDAPPT_GEN_ALL_CORE_FT
Do you need a primary care doctor or follow-up with a specialist? Our care coordinators will help you find providers near you and schedule any follow-up care visits.    Monday-Friday: 9am-5pm    Call our [facility] team: [phone]

## 2024-08-31 NOTE — DISCHARGE NOTE PROVIDER - CARE PROVIDER_API CALL
Cheli Valdes  Internal Medicine  [address] [place] [address]  Phone: [phone]  Fax: [phone]  Established Patient  Follow Up Time: 2 weeks   Cheli Valdes  Internal Medicine  [address] [place] [address]  Phone: [phone]  Fax: [phone]  Established Patient  Follow Up Time: 2 weeks    Roberto Romero  Pulmonary Disease  [address] [place] [address]  Phone: [phone]  Fax: [phone]  Established Patient  Follow Up Time: 2 weeks   Cheli Valdes  Internal Medicine  [address] [place] [address]  Phone: [phone]  Fax: [phone]  Established Patient  Follow Up Time: 2 weeks    Roberto Romero  Pulmonary Disease  [address] [place] [address]  Phone: [phone]  Fax: [phone]  Established Patient  Follow Up Time: 2 weeks    Maritza Saravia  Rheumatology  [address] [place] [address]  Phone: [phone]  Fax: [phone]  Follow Up Time: 2 weeks

## 2024-08-31 NOTE — DISCHARGE NOTE PROVIDER - HOSPITAL COURSE
84yoM with a PMH of prostate cancer on xtandi, A. fib on dabigatran, DM on metformin, diabetic neuropathy on gabapentin, gout (recent flare in R fingers in June), HTN, HLD presents with fever, chills, redness, swelling, and pain from L elbow radiating down to L forearm and hand after sustaining abrasion while holding open elevator door 4 days ago. Patient states that he was at Dr. Arellano's office with already present L elbow swelling. He tried to keep elevator door open for kids and the door closed on his forearm leaving a cut. After this he started having increased pain, erythema, warmth, swelling from the cut all the way to the hand and the elbow. Patient had one episode of fever to 101. Patient went to urgent care for this but was sent to the ED. He endorses pain from the elbow down when trying to move LUE. Patient's most recent gout flare was in June 2024 in his RUE fingers, treated with ibuprofen. Patient had LLE cellulitis treated in [facility] 2 years ago but does not remember what antibiotics he was on. Denies sore throat, difficulty swallowing, coughing, abd pain, back pain, dysuria, chest pain, sob, current fever or chills, diarrhea, constipation,     In the ED,  - Vital signs: /67, HR 97, RR 18, Temp 98.3F, SpO2 99% on RA  - Labs significant for: ESR 64 (inc), AG 15 (inc), .6 (inc), WBC 14.69 (inc), 83% neutrophils, Hb 12.1 (dec), glucose 161 (inc), INR 1.45  - Imaging: CXR- no acute consolidation, extensive b/l calcified pleural plaques; XR L elbow- Questionable elbow joint effusion. No definite fracture or osteomyelitis. No radiopaque foreign body.  - EKG: a. fib, RBBB,   - Consults: Ortho- no acute intervention, WBAT LUE, Pain control as needed, Extremity icing/elevation to alleviate swelling, trend WBC/ESR/CRP  - S/p ceftriaxone IVPB 2000mg x1, metronidazole IVPB 500mg x1, acetaminophen 650mg PO x1, NS 500ml Bolus X1    Admitted for cellulitis of LUE.     #Cellulitis of LUE  - Erythema, pain, edema, warmth extending from L hand to L elbow w/ area of laceration on L anterior forearm  - Vital signs wnl on admission  - 1 ep of fever last night to 101  - Afebrile on admission  - XR L elbow and forearm- Questionable elbow joint effusion. No definite fracture or osteomyelitis. No radiopaque foreign body.  - WBC inc w/ left shift, ESR inc, CRP inc on admission  - S/p ceftriaxone and metronidazole in ED  - Ortho recs appreciated:       - No acute intervention  - F/u BCx  - Started vancomycin IVPB 1000mg  - Ordered ID consult  - Started PRN tylenol for pain control    #Gout  - Recent flare in RUE fingers in June 2024  - LUE elbow already edematous prior to injury  - Kidney function wnl on admission  - F/u uric acid level    #Prostate CA  - On xtandi  - Follows with Dr. Marsh    #A.fib   - on dabigatran 300qd at home  - EKG: a. fib, RBBB,  on admission  - Follows with Dr. Mccarthy  - INR inc on admission  - C/w metoprolol  - C/w dabigatran 150 BID  - Ordered cardio c/s    #Diabetes  - On metformin at home- held  - F/u A1c  - Started ISS    #HTN  #HLD  - C/w home meds    #MISC  - DVT ppx: dabigatran   - GI ppx: not needed  - Diet: DASH w/ CC  - Code: FULL   84yoM with a PMH of prostate cancer on xtandi, A. fib on dabigatran, DM on metformin, diabetic neuropathy on gabapentin, gout (recent flare in R fingers in June), HTN, HLD presents with fever, chills, redness, swelling, and pain from L elbow radiating down to L forearm and hand after sustaining abrasion while holding open elevator door 4 days ago. Patient states that he was at Dr. Arellano's office with already present L elbow swelling. He tried to keep elevator door open for kids and the door closed on his forearm leaving a cut. After this he started having increased pain, erythema, warmth, swelling from the cut all the way to the hand and the elbow. Patient had one episode of fever to 101. Patient went to urgent care for this but was sent to the ED. He endorses pain from the elbow down when trying to move LUE. Patient's most recent gout flare was in June 2024 in his RUE fingers, treated with ibuprofen. Patient had LLE cellulitis treated in [facility] 2 years ago but does not remember what antibiotics he was on. Denies sore throat, difficulty swallowing, coughing, abd pain, back pain, dysuria, chest pain, sob, current fever or chills, diarrhea, constipation,     In the ED,  - Vital signs: /67, HR 97, RR 18, Temp 98.3F, SpO2 99% on RA  - Labs significant for: ESR 64 (inc), AG 15 (inc), .6 (inc), WBC 14.69 (inc), 83% neutrophils, Hb 12.1 (dec), glucose 161 (inc), INR 1.45  - Imaging: CXR- no acute consolidation, extensive b/l calcified pleural plaques; XR L elbow- Questionable elbow joint effusion. No definite fracture or osteomyelitis. No radiopaque foreign body.  - EKG: a. fib, RBBB,   - Consults: Ortho- no acute intervention, WBAT LUE, Pain control as needed, Extremity icing/elevation to alleviate swelling, trend WBC/ESR/CRP  - S/p ceftriaxone IVPB 2000mg x1, metronidazole IVPB 500mg x1, acetaminophen 650mg PO x1, NS 500ml Bolus X1    Admitted for cellulitis of LUE.     #Cellulitis of LUE  - Erythema, pain, edema, warmth extending from L hand to L elbow w/ area of laceration on L anterior forearm  - Vital signs wnl on admission  - Afebrile on admission  - XR L elbow and forearm- Questionable elbow joint effusion. No definite fracture or osteomyelitis. No radiopaque foreign body.  - WBC inc w/ left shift, ESR inc, CRP inc on admission  - S/p ceftriaxone and metronidazole in ED  - Ortho recs appreciated: No acute intervention  - F/u BCx: Negative  - Started vancomycin IVPB 1000mg and later switched to Cefazolin IVPB 1gm q8hr as per ID  - ID was consulted  - Started PRN tylenol for pain control  -Prednisone was added for possible gout flare    #Gout  - Recent flare in RUE fingers in June 2024  - LUE elbow already edematous prior to injury  - Kidney function wnl on admission  - F/u uric acid level  -Prednisone was added for possible gout flare    #Prostate CA  - On xtandi  - Follows with Dr. Marsh    #A.fib   - on dabigatran 300qd at home  - EKG: a. fib, RBBB,  on admission  - Follows with Dr. Mccarthy  - INR inc on admission  - C/w metoprolol  - C/w dabigatran 150 BID    #Diabetes  - On metformin at home- held  - F/u A1c  - Started ISS    #HTN  #HLD  - C/w home meds    #MISC  - DVT ppx: dabigatran   - GI ppx: not needed  - Diet: DASH w/ CC  - Code: FULL    The discharge plan with medication reconciliation was discussed with Dr. Lima on 9/2/24 and the discharge was approved.    84yoM with a PMH of prostate cancer on xtandi, A. fib on dabigatran, DM on metformin, diabetic neuropathy on gabapentin, gout (recent flare in R fingers in June), HTN, HLD presents with fever, chills, redness, swelling, and pain from L elbow radiating down to L forearm and hand after sustaining abrasion while holding open elevator door 4 days ago. Patient states that he was at Dr. Arellano's office with already present L elbow swelling. He tried to keep elevator door open for kids and the door closed on his forearm leaving a cut. After this he started having increased pain, erythema, warmth, swelling from the cut all the way to the hand and the elbow. Patient had one episode of fever to 101. Patient went to urgent care for this but was sent to the ED. He endorses pain from the elbow down when trying to move LUE. Patient's most recent gout flare was in June 2024 in his RUE fingers, treated with ibuprofen. Patient had LLE cellulitis treated in [facility] 2 years ago but does not remember what antibiotics he was on. Denies sore throat, difficulty swallowing, coughing, abd pain, back pain, dysuria, chest pain, sob, current fever or chills, diarrhea, constipation,     In the ED,  - Vital signs: /67, HR 97, RR 18, Temp 98.3F, SpO2 99% on RA  - Labs significant for: ESR 64 (inc), AG 15 (inc), .6 (inc), WBC 14.69 (inc), 83% neutrophils, Hb 12.1 (dec), glucose 161 (inc), INR 1.45  - Imaging: CXR- no acute consolidation, extensive b/l calcified pleural plaques; XR L elbow- Questionable elbow joint effusion. No definite fracture or osteomyelitis. No radiopaque foreign body.  - EKG: a. fib, RBBB,   - Consults: Ortho- no acute intervention, WBAT LUE, Pain control as needed, Extremity icing/elevation to alleviate swelling, trend WBC/ESR/CRP  - S/p ceftriaxone IVPB 2000mg x1, metronidazole IVPB 500mg x1, acetaminophen 650mg PO x1, NS 500ml Bolus X1    Admitted for cellulitis of LUE.     #Cellulitis of LUE  - Erythema, pain, edema, warmth extending from L hand to L elbow w/ area of laceration on L anterior forearm  - Vital signs wnl on admission  - Afebrile on admission  - XR L elbow and forearm- Questionable elbow joint effusion. No definite fracture or osteomyelitis. No radiopaque foreign body.  - WBC inc w/ left shift, ESR inc, CRP inc on admission  - S/p ceftriaxone and metronidazole in ED  - Ortho recs appreciated: No acute intervention  - F/u BCx: Negative  - Started vancomycin IVPB 1000mg and later switched to Cefazolin IVPB 1gm q8hr as per ID  - ID was consulted  - Started PRN tylenol for pain control  -Prednisone was added for possible gout flare    #Gout  - Recent flare in RUE fingers in June 2024  - LUE elbow already edematous prior to injury  - Kidney function wnl on admission  - F/u uric acid level  -Prednisone was added for possible gout flare    #Prostate CA  - On xtandi  - Follows with Dr. Marsh    #A.fib   - on dabigatran 300qd at home  - EKG: a. fib, RBBB,  on admission  - Follows with Dr. Mccarthy  - INR inc on admission  - C/w metoprolol  - C/w dabigatran 150 BID    #Diabetes  - On metformin at home- held  - F/u A1c  - Started ISS    #HTN  #HLD  - C/w home meds    #MISC  - DVT ppx: dabigatran   - GI ppx: not needed  - Diet: DASH w/ CC  - Code: FULL    The discharge plan with medication reconciliation was discussed with Dr. Lima on 9/3/24 and the discharge was approved.    84yoM with a PMH of prostate cancer on xtandi, A. fib on dabigatran, DM on metformin, diabetic neuropathy on gabapentin, gout (recent flare in R fingers in June), HTN, HLD presents with fever, chills, redness, swelling, and pain from L elbow radiating down to L forearm and hand after sustaining abrasion while holding open elevator door 4 days ago. Patient states that he was at Dr. Arellano's office with already present L elbow swelling. He tried to keep elevator door open for kids and the door closed on his forearm leaving a cut. After this he started having increased pain, erythema, warmth, swelling from the cut all the way to the hand and the elbow. Patient had one episode of fever to 101. Patient went to urgent care for this but was sent to the ED. He endorses pain from the elbow down when trying to move LUE. Patient's most recent gout flare was in June 2024 in his RUE fingers, treated with ibuprofen. Patient had LLE cellulitis treated in [facility] 2 years ago but does not remember what antibiotics he was on. Denies sore throat, difficulty swallowing, coughing, abd pain, back pain, dysuria, chest pain, sob, current fever or chills, diarrhea, constipation,     In the ED,  - Vital signs: /67, HR 97, RR 18, Temp 98.3F, SpO2 99% on RA  - Labs significant for: ESR 64 (inc), AG 15 (inc), .6 (inc), WBC 14.69 (inc), 83% neutrophils, Hb 12.1 (dec), glucose 161 (inc), INR 1.45  - Imaging: CXR- no acute consolidation, extensive b/l calcified pleural plaques; XR L elbow- Questionable elbow joint effusion. No definite fracture or osteomyelitis. No radiopaque foreign body.  - EKG: a. fib, RBBB,   - Consults: Ortho- no acute intervention, WBAT LUE, Pain control as needed, Extremity icing/elevation to alleviate swelling, trend WBC/ESR/CRP  - S/p ceftriaxone IVPB 2000mg x1, metronidazole IVPB 500mg x1, acetaminophen 650mg PO x1, NS 500ml Bolus X1    Admitted for cellulitis of LUE.     #Cellulitis of LUE  - Erythema, pain, edema, warmth extending from L hand to L elbow w/ area of laceration on L anterior forearm  - Vital signs wnl on admission  - Afebrile on admission  - XR L elbow and forearm- Questionable elbow joint effusion. No definite fracture or osteomyelitis. No radiopaque foreign body.  - WBC inc w/ left shift, ESR inc, CRP inc on admission  - S/p ceftriaxone and metronidazole in ED  - Ortho recs appreciated: No acute intervention  - F/u BCx: Negative  - Started vancomycin IVPB 1000mg and later switched to Cefazolin IVPB 1gm q8hr as per ID  - ID was consulted  - Started PRN tylenol for pain control  -Prednisone was added for possible gout flare    #Gout  - Recent flare in RUE fingers in June 2024  - LUE elbow already edematous prior to injury  - Kidney function wnl on admission  - F/u uric acid level  -Prednisone was added for possible gout flare    #Prostate CA  - On xtandi  - Follows with Dr. Marsh    #A.fib   - on dabigatran 300qd at home  - EKG: a. fib, RBBB,  on admission  - Follows with Dr. Mccarthy  - INR inc on admission  - C/w metoprolol  - C/w dabigatran 150 BID    #Diabetes  - restart metformin on discharge    #HTN  #HLD  - C/w home meds      The discharge plan with medication reconciliation was discussed with Dr. Lima on 9/3/24 and the discharge was approved.

## 2024-09-01 LAB
ANION GAP SERPL CALC-SCNC: 13 MMOL/L — SIGNIFICANT CHANGE UP (ref 7–14)
BUN SERPL-MCNC: 12 MG/DL — SIGNIFICANT CHANGE UP (ref 10–20)
CALCIUM SERPL-MCNC: 9.3 MG/DL — SIGNIFICANT CHANGE UP (ref 8.4–10.5)
CHLORIDE SERPL-SCNC: 101 MMOL/L — SIGNIFICANT CHANGE UP (ref 98–110)
CO2 SERPL-SCNC: 25 MMOL/L — SIGNIFICANT CHANGE UP (ref 17–32)
CREAT SERPL-MCNC: 0.6 MG/DL — LOW (ref 0.7–1.5)
EGFR: 95 ML/MIN/1.73M2 — SIGNIFICANT CHANGE UP
GLUCOSE BLDC GLUCOMTR-MCNC: 104 MG/DL — HIGH (ref 70–99)
GLUCOSE BLDC GLUCOMTR-MCNC: 109 MG/DL — HIGH (ref 70–99)
GLUCOSE BLDC GLUCOMTR-MCNC: 122 MG/DL — HIGH (ref 70–99)
GLUCOSE BLDC GLUCOMTR-MCNC: 131 MG/DL — HIGH (ref 70–99)
GLUCOSE SERPL-MCNC: 116 MG/DL — HIGH (ref 70–99)
HCT VFR BLD CALC: 34.7 % — LOW (ref 42–52)
HGB BLD-MCNC: 11.8 G/DL — LOW (ref 14–18)
MAGNESIUM SERPL-MCNC: 1.9 MG/DL — SIGNIFICANT CHANGE UP (ref 1.8–2.4)
MCHC RBC-ENTMCNC: 31.3 PG — HIGH (ref 27–31)
MCHC RBC-ENTMCNC: 34 G/DL — SIGNIFICANT CHANGE UP (ref 32–37)
MCV RBC AUTO: 92 FL — SIGNIFICANT CHANGE UP (ref 80–94)
NRBC # BLD: 0 /100 WBCS — SIGNIFICANT CHANGE UP (ref 0–0)
PLATELET # BLD AUTO: 212 K/UL — SIGNIFICANT CHANGE UP (ref 130–400)
PMV BLD: 10.6 FL — HIGH (ref 7.4–10.4)
POTASSIUM SERPL-MCNC: 3.2 MMOL/L — LOW (ref 3.5–5)
POTASSIUM SERPL-SCNC: 3.2 MMOL/L — LOW (ref 3.5–5)
RBC # BLD: 3.77 M/UL — LOW (ref 4.7–6.1)
RBC # FLD: 13.8 % — SIGNIFICANT CHANGE UP (ref 11.5–14.5)
SODIUM SERPL-SCNC: 139 MMOL/L — SIGNIFICANT CHANGE UP (ref 135–146)
WBC # BLD: 7.48 K/UL — SIGNIFICANT CHANGE UP (ref 4.8–10.8)
WBC # FLD AUTO: 7.48 K/UL — SIGNIFICANT CHANGE UP (ref 4.8–10.8)

## 2024-09-01 PROCEDURE — 99231 SBSQ HOSP IP/OBS SF/LOW 25: CPT

## 2024-09-01 RX ADMIN — Medication 5.5 UNIT(S): at 08:26

## 2024-09-01 RX ADMIN — CEFAZOLIN SODIUM 50 MILLIGRAM(S): 2 INJECTION, SOLUTION INTRAVENOUS at 05:42

## 2024-09-01 RX ADMIN — DABIGATRAN ETEXILATE MESYLATE 150 MILLIGRAM(S): 40 PELLET ORAL at 17:35

## 2024-09-01 RX ADMIN — CEFAZOLIN SODIUM 50 MILLIGRAM(S): 2 INJECTION, SOLUTION INTRAVENOUS at 21:09

## 2024-09-01 RX ADMIN — CEFAZOLIN SODIUM 50 MILLIGRAM(S): 2 INJECTION, SOLUTION INTRAVENOUS at 13:20

## 2024-09-01 RX ADMIN — ACETAMINOPHEN 650 MILLIGRAM(S): 325 TABLET ORAL at 17:28

## 2024-09-01 RX ADMIN — Medication 40 MILLIGRAM(S): at 05:41

## 2024-09-01 RX ADMIN — ACETAMINOPHEN 650 MILLIGRAM(S): 325 TABLET ORAL at 15:44

## 2024-09-01 RX ADMIN — NIFEDIPINE 30 MILLIGRAM(S): 60 TABLET, FILM COATED, EXTENDED RELEASE ORAL at 17:36

## 2024-09-01 RX ADMIN — METOPROLOL TARTRATE 25 MILLIGRAM(S): 100 TABLET ORAL at 05:41

## 2024-09-01 RX ADMIN — NIFEDIPINE 30 MILLIGRAM(S): 60 TABLET, FILM COATED, EXTENDED RELEASE ORAL at 05:42

## 2024-09-01 RX ADMIN — DABIGATRAN ETEXILATE MESYLATE 150 MILLIGRAM(S): 40 PELLET ORAL at 05:40

## 2024-09-01 RX ADMIN — ENZALUTAMIDE 160 MILLIGRAM(S): 40 TABLET ORAL at 12:17

## 2024-09-01 RX ADMIN — LOSARTAN POTASSIUM 100 MILLIGRAM(S): 50 TABLET ORAL at 05:41

## 2024-09-01 NOTE — PROGRESS NOTE ADULT - ASSESSMENT
swelling, pain left elbow lower forearm - ?cellulitis vs. abrasian vs. gout - most likely cellulitis since uric acid 4.9, and was very warm and red.  now improving  atrial fibrillation - persistent  elevated BS - type 2 DM  prostate Ca - on oral chemotherapy  HTN - on nifedipine/losartan/metoprolol - BP at target, even a bit below  Plan:  IV cefazolin/po prednisone  today day # 3 prednisone will give 5 days - uric acid 4.9 so doubt gout  patient on DOAC for a fib  elevated BS likely exacerbated by steroids - on home on metformin - HbA1C 5 so ? if he even needs diabetes medication  anticipate DC tomorrow on oral cephalexin for 7 days, prednisone for 1 more day, and possibly no medication for diabetes  discuss plan with daughter

## 2024-09-02 LAB
ANION GAP SERPL CALC-SCNC: 12 MMOL/L — SIGNIFICANT CHANGE UP (ref 7–14)
BUN SERPL-MCNC: 11 MG/DL — SIGNIFICANT CHANGE UP (ref 10–20)
CALCIUM SERPL-MCNC: 9.6 MG/DL — SIGNIFICANT CHANGE UP (ref 8.4–10.5)
CHLORIDE SERPL-SCNC: 104 MMOL/L — SIGNIFICANT CHANGE UP (ref 98–110)
CO2 SERPL-SCNC: 26 MMOL/L — SIGNIFICANT CHANGE UP (ref 17–32)
CREAT SERPL-MCNC: 0.7 MG/DL — SIGNIFICANT CHANGE UP (ref 0.7–1.5)
EGFR: 91 ML/MIN/1.73M2 — SIGNIFICANT CHANGE UP
GLUCOSE BLDC GLUCOMTR-MCNC: 111 MG/DL — HIGH (ref 70–99)
GLUCOSE BLDC GLUCOMTR-MCNC: 136 MG/DL — HIGH (ref 70–99)
GLUCOSE BLDC GLUCOMTR-MCNC: 144 MG/DL — HIGH (ref 70–99)
GLUCOSE BLDC GLUCOMTR-MCNC: 235 MG/DL — HIGH (ref 70–99)
GLUCOSE SERPL-MCNC: 141 MG/DL — HIGH (ref 70–99)
HCT VFR BLD CALC: 35.7 % — LOW (ref 42–52)
HGB BLD-MCNC: 12.3 G/DL — LOW (ref 14–18)
MAGNESIUM SERPL-MCNC: 1.8 MG/DL — SIGNIFICANT CHANGE UP (ref 1.8–2.4)
MCHC RBC-ENTMCNC: 31.7 PG — HIGH (ref 27–31)
MCHC RBC-ENTMCNC: 34.5 G/DL — SIGNIFICANT CHANGE UP (ref 32–37)
MCV RBC AUTO: 92 FL — SIGNIFICANT CHANGE UP (ref 80–94)
NRBC # BLD: 0 /100 WBCS — SIGNIFICANT CHANGE UP (ref 0–0)
PLATELET # BLD AUTO: 216 K/UL — SIGNIFICANT CHANGE UP (ref 130–400)
PMV BLD: 10.3 FL — SIGNIFICANT CHANGE UP (ref 7.4–10.4)
POTASSIUM SERPL-MCNC: 3.5 MMOL/L — SIGNIFICANT CHANGE UP (ref 3.5–5)
POTASSIUM SERPL-SCNC: 3.5 MMOL/L — SIGNIFICANT CHANGE UP (ref 3.5–5)
RBC # BLD: 3.88 M/UL — LOW (ref 4.7–6.1)
RBC # FLD: 13.6 % — SIGNIFICANT CHANGE UP (ref 11.5–14.5)
SODIUM SERPL-SCNC: 142 MMOL/L — SIGNIFICANT CHANGE UP (ref 135–146)
WBC # BLD: 5.99 K/UL — SIGNIFICANT CHANGE UP (ref 4.8–10.8)
WBC # FLD AUTO: 5.99 K/UL — SIGNIFICANT CHANGE UP (ref 4.8–10.8)

## 2024-09-02 PROCEDURE — 99232 SBSQ HOSP IP/OBS MODERATE 35: CPT

## 2024-09-02 RX ORDER — SENNA 187 MG
2 TABLET ORAL ONCE
Refills: 0 | Status: COMPLETED | OUTPATIENT
Start: 2024-09-02 | End: 2024-09-02

## 2024-09-02 RX ORDER — POLYETHYLENE GLYCOL 3350 17 G/17G
17 POWDER, FOR SOLUTION ORAL
Qty: 238 | Refills: 0
Start: 2024-09-02 | End: 2024-09-08

## 2024-09-02 RX ORDER — SENNA 187 MG
2 TABLET ORAL
Qty: 14 | Refills: 0
Start: 2024-09-02 | End: 2024-09-08

## 2024-09-02 RX ORDER — POLYETHYLENE GLYCOL 3350 17 G/17G
17 POWDER, FOR SOLUTION ORAL
Refills: 0 | Status: DISCONTINUED | OUTPATIENT
Start: 2024-09-02 | End: 2024-09-03

## 2024-09-02 RX ADMIN — CEFAZOLIN SODIUM 50 MILLIGRAM(S): 2 INJECTION, SOLUTION INTRAVENOUS at 15:28

## 2024-09-02 RX ADMIN — POLYETHYLENE GLYCOL 3350 17 GRAM(S): 17 POWDER, FOR SOLUTION ORAL at 17:16

## 2024-09-02 RX ADMIN — NIFEDIPINE 30 MILLIGRAM(S): 60 TABLET, FILM COATED, EXTENDED RELEASE ORAL at 05:52

## 2024-09-02 RX ADMIN — ENZALUTAMIDE 160 MILLIGRAM(S): 40 TABLET ORAL at 11:58

## 2024-09-02 RX ADMIN — Medication 5.5 UNIT(S): at 07:48

## 2024-09-02 RX ADMIN — Medication 40 MILLIGRAM(S): at 05:44

## 2024-09-02 RX ADMIN — Medication 2: at 11:56

## 2024-09-02 RX ADMIN — Medication 2 TABLET(S): at 11:57

## 2024-09-02 RX ADMIN — POLYETHYLENE GLYCOL 3350 17 GRAM(S): 17 POWDER, FOR SOLUTION ORAL at 11:58

## 2024-09-02 RX ADMIN — Medication 5.5 UNIT(S): at 11:55

## 2024-09-02 RX ADMIN — DABIGATRAN ETEXILATE MESYLATE 150 MILLIGRAM(S): 40 PELLET ORAL at 05:46

## 2024-09-02 RX ADMIN — LOSARTAN POTASSIUM 100 MILLIGRAM(S): 50 TABLET ORAL at 05:45

## 2024-09-02 RX ADMIN — CEFAZOLIN SODIUM 50 MILLIGRAM(S): 2 INJECTION, SOLUTION INTRAVENOUS at 21:05

## 2024-09-02 RX ADMIN — METOPROLOL TARTRATE 25 MILLIGRAM(S): 100 TABLET ORAL at 05:47

## 2024-09-02 RX ADMIN — CEFAZOLIN SODIUM 50 MILLIGRAM(S): 2 INJECTION, SOLUTION INTRAVENOUS at 05:47

## 2024-09-02 RX ADMIN — DABIGATRAN ETEXILATE MESYLATE 150 MILLIGRAM(S): 40 PELLET ORAL at 17:16

## 2024-09-02 RX ADMIN — NIFEDIPINE 30 MILLIGRAM(S): 60 TABLET, FILM COATED, EXTENDED RELEASE ORAL at 17:18

## 2024-09-02 NOTE — PROGRESS NOTE ADULT - ASSESSMENT
#Left elbow-forearm cellulitis vs abrasion vs gout---improving  -normal Uric acid (4.9) therefore more likely cellulitis   -continue iv abx Ancef---transition to oral keflex to complete total 10 day course abx on dc (today day #4 of abx)  -today day #4 of 5 of prednisone (last day tomorrow)  -arm elevation---local wound care    #AF  #HTN  -continue noac (pradaxa 150 mg bid)  -rate controlled on toprol xl 25 mg daily  -continue nifedipine and losartan for bp managment (remains at target)  -cardiology f/u as outpatient    #DM2  monitor for hyperglycemia while on steroids (day #4 of 5 of prednisone)  continue lispro 5.5 u qac  restart home metformin on dc   hba1c 5.0    dvt/gi ppx    FULL CODE    guarded prognosis    anticipate likely dc home in am to conitnue oral abx (keflex) and f/u with pcp and rheum as outatpeitn   #Left elbow-forearm cellulitis vs abrasion vs gout---improving  -normal Uric acid (4.9) therefore more likely cellulitis   -continue iv abx Ancef---transition to oral keflex to complete total 10 day course abx on dc (today day #4 of abx)  -today day #4 of 5 of prednisone (last day tomorrow)  -arm elevation---local wound care    #AF  #HTN  -continue noac (pradaxa 150 mg bid)  -rate controlled on toprol xl 25 mg daily  -continue nifedipine and losartan for bp managment (remains at target)  -cardiology f/u as outpatient    #DM2  monitor for hyperglycemia while on steroids (day #4 of 5 of prednisone)  continue lispro 5.5 u qac  restart home metformin on dc   hba1c 5.0    dvt/gi ppx    FULL CODE    guarded prognosis    anticipate likely dc home in am to conitnue oral abx (keflex) and f/u with pcp and rheum as outatpeitn      Total time spent to complete patient's bedside assessment, review medical chart, discuss medical plan of care with covering medical team was more than 35 minutes  with >50% of time spendt face to face with patient, discussion with patient/family and/or coordination of care

## 2024-09-02 NOTE — PROGRESS NOTE ADULT - ASSESSMENT
84yoM with a PMH of prostate cancer on xtandi, A. fib on dabigatran, DM on metformin, diabetic neuropathy on gabapentin, gout (recent flare in R fingers in June), HTN, HLD presents with fever, chills, redness, swelling, and pain from L elbow radiating down to L forearm and hand after sustaining abrasion while holding open elevator door 4 days ago. Admitted for cellulitis of LUE.    #Cellulitis of LUE, ?gout   - Erythema, pain, edema, warmth extending from L hand to L elbow w/ area of laceration on L anterior forearm  - Vital signs wnl on admission  - 1 ep of fever last night to 101  - Afebrile on admission  - XR L elbow and forearm- Questionable elbow joint effusion. No definite fracture or osteomyelitis. No radiopaque foreign body.  - WBC inc w/ left shift, ESR inc, CRP inc on admission  - lower concern for septic elbow at this time  - S/p ceftriaxone and metronidazole in ED    - Ortho recs appreciated:   no acute intervention at this time, monitor exam  - WBAT LUE  - Pain control as needed  - Extremity icing/elevation to alleviate swelling  - trend WBC/ESR/CRP  - ID recs for antibiotics   - diet ok today    As per attending: swelling, pain left elbow lower forearm - ?cellulitis vs. abrasian vs. gout - most likely cellulitis since uric acid 4.9, and was very warm and red.  now improving  atrial fibrillation - persistent  elevated BS - type 2 DM  prostate Ca - on oral chemotherapy  HTN - on nifedipine/losartan/metoprolol - BP at target, even a bit below  Plan:  IV cefazolin/po prednisone  today day # 3 prednisone will give 5 days - uric acid 4.9 so doubt gout  patient on DOAC for a fib  elevated BS likely exacerbated by steroids - on home on metformin - HbA1C 5 so ? if he even needs diabetes medication  anticipate DC tomorrow on oral cephalexin for 7 days, prednisone for 1 more day, and possibly no medication for diabetes  discuss plan with daughter    - F/u BCx 8/30- negativw  - Started vancomycin IVPB 1000mg  - Ordered ID consult  - Started PRN tylenol for pain control    #Gout  - Recent flare in RUE fingers in June 2024  - LUE elbow already edematous prior to injury  - Kidney function wnl on admission  - F/u uric acid level    #Prostate CA  - On xtandi  - Follows with Dr. Marsh    #A.fib   - on dabigatran 300qd at home  - EKG: a. fib, RBBB,  on admission  - Follows with Dr. Mccarthy  - INR inc on admission  - C/w metoprolol  - C/w dabigatran 150 BID  - Ordered cardio c/s    #Diabetes  - On metformin at home- held  - F/u A1c - 5  - Started ISS    #HTN  #HLD  - C/w home meds    #MISC  - DVT ppx: dabigatran   - GI ppx: not needed  - Diet: DASH w/ CC  - Code: FULL    Pending: anticipation for discharge gorge   84yoM with a PMH of prostate cancer on xtandi, A. fib on dabigatran, DM on metformin, diabetic neuropathy on gabapentin, gout (recent flare in R fingers in June), HTN, HLD presents with fever, chills, redness, swelling, and pain from L elbow radiating down to L forearm and hand after sustaining abrasion while holding open elevator door 4 days ago. Admitted for cellulitis of LUE.    #Cellulitis of LUE, ?gout   - Erythema, pain, edema, warmth extending from L hand to L elbow w/ area of laceration on L anterior forearm  - Vital signs wnl on admission  - 1 ep of fever last night to 101  - Afebrile on admission  - XR L elbow and forearm- Questionable elbow joint effusion. No definite fracture or osteomyelitis. No radiopaque foreign body.  - WBC inc w/ left shift, ESR inc, CRP inc on admission  - lower concern for septic elbow at this time  - S/p ceftriaxone and metronidazole in ED  - Ortho recs appreciated:   no acute intervention at this time, monitor exam  - WBAT LUE  - Pain control as needed  - Extremity icing/elevation to alleviate swelling  - trend WBC/ESR/CRP  - ID consulted with for antibiotics  - IV cefazolin/po prednisone,  prednisone for 1 more day,  - F/u BCx 8/30- negativw    #Gout  - Recent flare in RUE fingers in June 2024  - LUE elbow already edematous prior to injury  - Kidney function wnl on admission  - F/u uric acid level 4.5    #Prostate CA  - On xtandi  - Follows with Dr. Marsh    #A.fib   - on dabigatran 300qd at home  - EKG: a. fib, RBBB,  on admission  - Follows with Dr. Mccarthy  - INR inc on admission  - C/w metoprolol  - C/w dabigatran 150 BID  - Ordered cardio c/s    #Diabetes  - On metformin at home- held  - F/u A1c - 5  - Started ISS    #HTN  #HLD  - C/w home meds    #MISC  - DVT ppx: dabigatran   - GI ppx: not needed  - Diet: DASH w/ CC  - Code: FULL    Progress note handoff : anticipate DC tomorrow on oral cephalexin for 7 days, and possibly no medication for diabetes

## 2024-09-03 ENCOUNTER — TRANSCRIPTION ENCOUNTER (OUTPATIENT)
Age: 84
End: 2024-09-03

## 2024-09-03 VITALS
OXYGEN SATURATION: 99 % | DIASTOLIC BLOOD PRESSURE: 56 MMHG | TEMPERATURE: 97 F | RESPIRATION RATE: 20 BRPM | SYSTOLIC BLOOD PRESSURE: 141 MMHG | HEART RATE: 64 BPM

## 2024-09-03 LAB
GLUCOSE BLDC GLUCOMTR-MCNC: 139 MG/DL — HIGH (ref 70–99)
GLUCOSE BLDC GLUCOMTR-MCNC: 250 MG/DL — HIGH (ref 70–99)

## 2024-09-03 PROCEDURE — 99239 HOSP IP/OBS DSCHRG MGMT >30: CPT

## 2024-09-03 RX ORDER — CEPHALEXIN 500 MG
1 CAPSULE ORAL
Qty: 16 | Refills: 0
Start: 2024-09-03 | End: 2024-09-06

## 2024-09-03 RX ORDER — CEPHALEXIN 500 MG
1 CAPSULE ORAL
Qty: 12 | Refills: 0
Start: 2024-09-03 | End: 2024-09-06

## 2024-09-03 RX ORDER — PREDNISONE 10 MG
2 TABLET, DOSE PACK ORAL
Qty: 4 | Refills: 0
Start: 2024-09-03 | End: 2024-09-04

## 2024-09-03 RX ADMIN — ENZALUTAMIDE 160 MILLIGRAM(S): 40 TABLET ORAL at 13:29

## 2024-09-03 RX ADMIN — Medication 5.5 UNIT(S): at 07:55

## 2024-09-03 RX ADMIN — Medication 40 MILLIGRAM(S): at 05:18

## 2024-09-03 RX ADMIN — DABIGATRAN ETEXILATE MESYLATE 150 MILLIGRAM(S): 40 PELLET ORAL at 05:17

## 2024-09-03 RX ADMIN — CEFAZOLIN SODIUM 50 MILLIGRAM(S): 2 INJECTION, SOLUTION INTRAVENOUS at 05:21

## 2024-09-03 RX ADMIN — NIFEDIPINE 30 MILLIGRAM(S): 60 TABLET, FILM COATED, EXTENDED RELEASE ORAL at 05:19

## 2024-09-03 RX ADMIN — METOPROLOL TARTRATE 25 MILLIGRAM(S): 100 TABLET ORAL at 05:20

## 2024-09-03 RX ADMIN — LOSARTAN POTASSIUM 100 MILLIGRAM(S): 50 TABLET ORAL at 05:19

## 2024-09-03 RX ADMIN — POLYETHYLENE GLYCOL 3350 17 GRAM(S): 17 POWDER, FOR SOLUTION ORAL at 05:22

## 2024-09-03 NOTE — PROGRESS NOTE ADULT - ASSESSMENT
· Assessment	  84yoM with a PMH of prostate cancer on xtandi, A. fib on dabigatran, DM on metformin, diabetic neuropathy on gabapentin, gout (recent flare in R fingers in June), HTN, HLD presents with fever, chills, redness, swelling, and pain from L elbow radiating down to L forearm and hand after sustaining abrasion while holding open elevator door 4 days ago. Patient states that he was at Dr. Arellano's office with already present L elbow swelling. He tried to keep elevator door open for kids and the door closed on his forearm leaving a cut. After this he started having increased pain, erythema, warmth, swelling from the cut all the way to the hand and the elbow. Patient had one episode of fever to 101. Patient went to urgent care for this but was sent to the ED. He endorses pain from the elbow down when trying to move LUE. Patient's most recent gout flare was in June 2024 in his RUE fingers, treated with ibuprofen. Patient had LLE cellulitis treated in [facility] 2 years ago but does not remember what antibiotics he was on. Denies sore throat, difficulty swallowing, coughing, abd pain, back pain, dysuria, chest pain, sob, current fever or chills, diarrhea, constipation,       IMPRESSION/RECOMMENDATIONS  Immunosuppression/Immunosenescence ( above age 60 yrs there is a exponential decline in immunity which could result in poor clinical outcomes.   Resolving polyarticular gout left wrist and left elbow with almost complete resolution of the edema. Movement of wrist/elbow normal  UA 4.9 which obviously does not exclude acute gout  No evidence of septic left elbow arthritis of septic olecranon bursitis  Doubt cellulitis  8/30 BCX NG  WBC 5.9  Superficial laceration distal left forearm : has healed well  AF (atrial fibrillation)  Diabetes  HTN (hypertension)  HLD (hyperlipidemia)    -po Prednisone 40 mg q24h for another 2 days then hold  -Ancef 1 gm iv q8> change to po Keflex 500 mg q8h for 4 more days  -f/u with Rheumatology    Discussed at length on the phone with his 2 daughter ( Thelma )

## 2024-09-03 NOTE — DISCHARGE NOTE NURSING/CASE MANAGEMENT/SOCIAL WORK - NSDCPEFALRISK_GEN_ALL_CORE
For information on Fall & Injury Prevention, visit: [email or web address] OR  [email or web address] OR  https://www.cdc.gov/steadi/patient.html

## 2024-09-03 NOTE — PROGRESS NOTE ADULT - REASON FOR ADMISSION
[FreeTextEntry1] : B12 injection #4
cellulitis of L elbow

## 2024-09-03 NOTE — DISCHARGE NOTE NURSING/CASE MANAGEMENT/SOCIAL WORK - NSDCFUADDAPPT_GEN_ALL_CORE_FT
Do you need a primary care doctor or follow-up with a specialist? Our care coordinators will help you find providers near you and schedule any follow-up care visits.    Monday-Friday: 9am-5pm    Call our [facility] team: [phone]

## 2024-09-03 NOTE — PROGRESS NOTE ADULT - SUBJECTIVE AND OBJECTIVE BOX
ORTHOPAEDIC SURGERY PROGRESS NOTE    Pt seen and examined at bedside. Doing much better, elbow pain improved since yesterday. Denies constitutional symptoms, numbness/tingling    Physical Exam:  Alert, NAD  Resp: NLB on RA.    LUE:  Skin intact  Swelling of the elbow with mild erythema  TTP at the elbow  AROM 20-80  PROM   no pain with micromotion  Firing AIN/PIN/Ulnar   Sensation intact   hand wwp      A/P: 84M with likely left elbow cellulitis, lower concern for septic elbow at this time. Will continue to monitor    - no acute intervention at this time, monitor exam  - WBAT LUE  - Pain control as needed  - Extremity icing/elevation to alleviate swelling  - trend WBC/ESR/CRP  - ID recs for antibiotics   - diet ok today  
patient seen and examined earlier today on rounds with resident.  still has pain left elbow but improved.  no fever. no chills.  Vital Signs Last 24 Hrs  T(C): 36.6 (31 Aug 2024 07:00), Max: 37.2 (30 Aug 2024 15:30)  T(F): 97.9 (31 Aug 2024 07:00), Max: 99 (30 Aug 2024 15:30)  HR: 82 (31 Aug 2024 07:00) (73 - 82)  BP: 135/64 (31 Aug 2024 07:00) (135/64 - 163/88)  BP(mean): 113 (30 Aug 2024 15:30) (113 - 113)  RR: 19 (31 Aug 2024 07:00) (18 - 19)  SpO2: 96% (31 Aug 2024 07:00) (96% - 98%)    Parameters below as of 31 Aug 2024 07:00  Patient On (Oxygen Delivery Method): room air       conj pink, no jaundice  neck supple. no JVD  lungs clear to auscultation. no crackles. no wheezing  heart irregular rate. no murmur or gallop  abd. soft nontender BS pos.  extr LUE elbow tender. left lower arm bruised and swollen. patient has good ROM but limited by pain                     11.5   8.69  )-----------( 172      ( 31 Aug 2024 07:35 )             33.4     08-31    141  |  104  |  12  ----------------------------<  142<H>  3.6   |  25  |  0.7    Ca    9.4      31 Aug 2024 07:35  Phos  3.0     08-31  Mg     1.6     08-31    TPro  6.7  /  Alb  3.9  /  TBili  0.8  /  DBili  x   /  AST  12  /  ALT  6   /  AlkPhos  84  08-30    CAPILLARY BLOOD GLUCOSE      POCT Blood Glucose.: 197 mg/dL (31 Aug 2024 11:13)  POCT Blood Glucose.: 158 mg/dL (31 Aug 2024 07:29)  POCT Blood Glucose.: 208 mg/dL (30 Aug 2024 21:08)  POCT Blood Glucose.: 130 mg/dL (30 Aug 2024 17:12)    MEDICATIONS  (STANDING):  ceFAZolin   IVPB 1000 milliGRAM(s) IV Intermittent every 8 hours  dabigatran 150 milliGRAM(s) Oral every 12 hours  dextrose 5%. 1000 milliLiter(s) (50 mL/Hr) IV Continuous <Continuous>  dextrose 5%. 1000 milliLiter(s) (100 mL/Hr) IV Continuous <Continuous>  dextrose 50% Injectable 12.5 Gram(s) IV Push once  dextrose 50% Injectable 25 Gram(s) IV Push once  dextrose 50% Injectable 25 Gram(s) IV Push once  enzalutamide 160 milliGRAM(s) Oral daily  glucagon  Injectable 1 milliGRAM(s) IntraMuscular once  insulin lispro (ADMELOG) corrective regimen sliding scale   SubCutaneous three times a day before meals  insulin lispro Injectable (ADMELOG) 5.5 Unit(s) SubCutaneous three times a day before meals  losartan 100 milliGRAM(s) Oral daily  magnesium sulfate  IVPB 2 Gram(s) IV Intermittent once  metoprolol succinate ER 25 milliGRAM(s) Oral daily  NIFEdipine XL 30 milliGRAM(s) Oral every 12 hours  predniSONE   Tablet 40 milliGRAM(s) Oral daily    
  VENICE GILES  84y, Male    All available historical data reviewed    OVERNIGHT EVENTS:    feels well and has no new complaints  No fevers   no pain left arm  ROS:  General: Denies rigors, nightsweats  HEENT: Denies headache, rhinorrhea, sore throat, eye pain  CV: Denies CP, palpitations  PULM: Denies wheezing, hemoptysis  GI: Denies hematemesis, hematochezia, melena  : Denies discharge, hematuria  MSK: Denies arthralgias, myalgias  SKIN: Denies rash, lesions  NEURO: Denies paresthesias, weakness  PSYCH: Denies depression, anxiety    VITALS:  T(F): 97.7, Max: 97.9 (09-02-24 @ 15:16)  HR: 80  BP: 166/79  RR: 18Vital Signs Last 24 Hrs  T(C): 36.5 (03 Sep 2024 00:43), Max: 36.6 (02 Sep 2024 15:16)  T(F): 97.7 (03 Sep 2024 00:43), Max: 97.9 (02 Sep 2024 15:16)  HR: 80 (03 Sep 2024 05:00) (80 - 80)  BP: 166/79 (03 Sep 2024 05:00) (156/78 - 166/79)  BP(mean): --  RR: 18 (03 Sep 2024 00:43) (18 - 18)  SpO2: 97% (03 Sep 2024 00:43) (97% - 97%)    Parameters below as of 03 Sep 2024 00:43  Patient On (Oxygen Delivery Method): room air        TESTS & MEASUREMENTS:                        12.3   5.99  )-----------( 216      ( 02 Sep 2024 08:04 )             35.7     09-02    142  |  104  |  11  ----------------------------<  141<H>  3.5   |  26  |  0.7    Ca    9.6      02 Sep 2024 08:04  Mg     1.8     09-02          Culture - Blood (collected 08-30-24 @ 02:15)  Source: .Blood Blood-Peripheral  Preliminary Report (09-02-24 @ 13:01):    No growth at 72 Hours    Culture - Blood (collected 08-30-24 @ 02:15)  Source: .Blood Blood-Peripheral  Preliminary Report (09-02-24 @ 13:01):    No growth at 72 Hours      Urinalysis Basic - ( 02 Sep 2024 08:04 )    Color: x / Appearance: x / SG: x / pH: x  Gluc: 141 mg/dL / Ketone: x  / Bili: x / Urobili: x   Blood: x / Protein: x / Nitrite: x   Leuk Esterase: x / RBC: x / WBC x   Sq Epi: x / Non Sq Epi: x / Bacteria: x          Social History:  Tobacco Use: No  Alcohol Use: No  Drug Use: No    RADIOLOGY & ADDITIONAL TESTS:  Personal review of radiological diagnostics performed  Echo and EKG results noted when applicable.     MEDICATIONS:  acetaminophen     Tablet .. 650 milliGRAM(s) Oral every 6 hours PRN  ceFAZolin   IVPB 1000 milliGRAM(s) IV Intermittent every 8 hours  dabigatran 150 milliGRAM(s) Oral every 12 hours  dextrose 5%. 1000 milliLiter(s) IV Continuous <Continuous>  dextrose 5%. 1000 milliLiter(s) IV Continuous <Continuous>  dextrose 50% Injectable 12.5 Gram(s) IV Push once  dextrose 50% Injectable 25 Gram(s) IV Push once  dextrose 50% Injectable 25 Gram(s) IV Push once  dextrose Oral Gel 15 Gram(s) Oral once PRN  enzalutamide 160 milliGRAM(s) Oral daily  glucagon  Injectable 1 milliGRAM(s) IntraMuscular once  insulin lispro (ADMELOG) corrective regimen sliding scale   SubCutaneous three times a day before meals  insulin lispro Injectable (ADMELOG) 5.5 Unit(s) SubCutaneous three times a day before meals  losartan 100 milliGRAM(s) Oral daily  metoprolol succinate ER 25 milliGRAM(s) Oral daily  NIFEdipine XL 30 milliGRAM(s) Oral every 12 hours  polyethylene glycol 3350 17 Gram(s) Oral two times a day  predniSONE   Tablet 40 milliGRAM(s) Oral daily      ANTIBIOTICS:  ceFAZolin   IVPB 1000 milliGRAM(s) IV Intermittent every 8 hours    
  Patient is a 84y old  Male who presents with a chief complaint of cellulitis of L elbow (02 Sep 2024 12:45)    HPI:  84yoM with a PMH of prostate cancer on xtandi, A. fib on dabigatran, DM on metformin, diabetic neuropathy on gabapentin, gout (recent flare in R fingers in June), HTN, HLD presents with fever, chills, redness, swelling, and pain from L elbow radiating down to L forearm and hand after sustaining abrasion while holding open elevator door 4 days ago. Patient states that he was at Dr. Arellano's office with already present L elbow swelling. He tried to keep elevator door open for kids and the door closed on his forearm leaving a cut. After this he started having increased pain, erythema, warmth, swelling from the cut all the way to the hand and the elbow. Patient had one episode of fever to 101. Patient went to urgent care for this but was sent to the ED. He endorses pain from the elbow down when trying to move LUE. Patient's most recent gout flare was in June 2024 in his RUE fingers, treated with ibuprofen. Patient had LLE cellulitis treated in [facility] 2 years ago but does not remember what antibiotics he was on. Denies sore throat, difficulty swallowing, coughing, abd pain, back pain, dysuria, chest pain, sob, current fever or chills, diarrhea, constipation,   Admitted for cellulitis of LUE.  (30 Aug 2024 08:56)    PAST MEDICAL & SURGICAL HISTORY:  Prostate CA  AF (atrial fibrillation)  Diabetes  HTN (hypertension)  HLD (hyperlipidemia)      patient seen and examined independently on morning rounds for the first time today, chart reviewed and discussed with the medicine resident and on interdisciplinary rounds:    hospital day #3  pcp- Dr. Nick ghosh in chair- reports not feeling well today and hesitant to go home 2/2 left arm pain---he lives with son anticipate dc home in am tomorrow provided no overnight events    Vital Signs Last 24 Hrs  T(C): 36.6 (02 Sep 2024 15:16), Max: 36.6 (02 Sep 2024 15:16)  T(F): 97.9 (02 Sep 2024 15:16), Max: 97.9 (02 Sep 2024 15:16)  HR: 80 (02 Sep 2024 15:16) (78 - 85)  BP: 158/82 (02 Sep 2024 15:16) (156/81 - 165/89)  BP(mean): --  RR: 18 (02 Sep 2024 15:16) (17 - 18)  SpO2: 97% (02 Sep 2024 15:16) (97% - 97%)    Parameters below as of 02 Sep 2024 15:16  Patient On (Oxygen Delivery Method): room air    PE:  GEN-NAD, AAOx3  PULM- Clear to auscultation bilaterally, fair air entry  CVS- +s1/s2 irreg irreg   GI- soft NT ND +bs, no rebound, no guarding  EXT- left elbow and forearm swelling with mild erythema                         12.3   5.99  )-----------( 216      ( 02 Sep 2024 08:04 )             35.7     09-02    142  |  104  |  11  ----------------------------<  141<H>  3.5   |  26  |  0.7    Ca    9.6      02 Sep 2024 08:04  Mg     1.8     09-02          Urinalysis Basic - ( 02 Sep 2024 08:04 )    Color: x / Appearance: x / SG: x / pH: x  Gluc: 141 mg/dL / Ketone: x  / Bili: x / Urobili: x   Blood: x / Protein: x / Nitrite: x   Leuk Esterase: x / RBC: x / WBC x   Sq Epi: x / Non Sq Epi: x / Bacteria: x          MEDICATIONS  (STANDING):  ceFAZolin   IVPB 1000 milliGRAM(s) IV Intermittent every 8 hours  dabigatran 150 milliGRAM(s) Oral every 12 hours  dextrose 5%. 1000 milliLiter(s) (50 mL/Hr) IV Continuous <Continuous>  dextrose 5%. 1000 milliLiter(s) (100 mL/Hr) IV Continuous <Continuous>  dextrose 50% Injectable 12.5 Gram(s) IV Push once  dextrose 50% Injectable 25 Gram(s) IV Push once  dextrose 50% Injectable 25 Gram(s) IV Push once  enzalutamide 160 milliGRAM(s) Oral daily  glucagon  Injectable 1 milliGRAM(s) IntraMuscular once  insulin lispro (ADMELOG) corrective regimen sliding scale   SubCutaneous three times a day before meals  insulin lispro Injectable (ADMELOG) 5.5 Unit(s) SubCutaneous three times a day before meals  losartan 100 milliGRAM(s) Oral daily  metoprolol succinate ER 25 milliGRAM(s) Oral daily  NIFEdipine XL 30 milliGRAM(s) Oral every 12 hours  polyethylene glycol 3350 17 Gram(s) Oral two times a day  predniSONE   Tablet 40 milliGRAM(s) Oral daily  
VENICE GILES 84y Male  MRN#: [id number]     Hospital Day: 3d    Pt is currently admitted with the primary diagnosis of  Cellulitis        SUBJECTIVE     Overnight events  None    Subjective complaints  Pt was evaluated this am. Patient denied any active complaints and per patient his symptoms are improving                                            ----------------------------------------------------------  OBJECTIVE  PAST MEDICAL & SURGICAL HISTORY  Prostate CA    AF (atrial fibrillation)    Diabetes    HTN (hypertension)    HLD (hyperlipidemia)                                              -----------------------------------------------------------  ALLERGIES:  No Known Allergies                                            ------------------------------------------------------------    HOME MEDICATIONS  Home Medications:  cyanocobalamin: 1 tab(s) orally once a day (30 Aug 2024 09:46)  dabigatran 150 mg oral capsule: 1 cap(s) orally 2 times a day (30 Aug 2024 14:33)  enzalutamide: 160 milligram(s) (30 Aug 2024 09:43)  folic acid: 1 tab(s) orally once a day (30 Aug 2024 09:46)  gabapentin 600 mg oral tablet: 2 cap(s) orally 2 times a day (30 Aug 2024 14:27)  losartan 100 mg oral tablet: 1 tab(s) orally once a day (30 Aug 2024 09:45)  metFORMIN 500 mg oral tablet: 1 tab(s) orally 2 times a day (30 Aug 2024 14:29)  metoprolol succinate 25 mg oral tablet, extended release: 1 tab(s) orally once a day (30 Aug 2024 09:44)  NIFEdipine 30 mg oral tablet, extended release: 1 tab(s) orally 2 times a day (30 Aug 2024 14:31)  thiamine: 1 tab(s) orally once a day (30 Aug 2024 09:47)                           MEDICATIONS:  STANDING MEDICATIONS  ceFAZolin   IVPB 1000 milliGRAM(s) IV Intermittent every 8 hours  dabigatran 150 milliGRAM(s) Oral every 12 hours  dextrose 5%. 1000 milliLiter(s) IV Continuous <Continuous>  dextrose 5%. 1000 milliLiter(s) IV Continuous <Continuous>  dextrose 50% Injectable 12.5 Gram(s) IV Push once  dextrose 50% Injectable 25 Gram(s) IV Push once  dextrose 50% Injectable 25 Gram(s) IV Push once  enzalutamide 160 milliGRAM(s) Oral daily  glucagon  Injectable 1 milliGRAM(s) IntraMuscular once  insulin lispro (ADMELOG) corrective regimen sliding scale   SubCutaneous three times a day before meals  insulin lispro Injectable (ADMELOG) 5.5 Unit(s) SubCutaneous three times a day before meals  losartan 100 milliGRAM(s) Oral daily  metoprolol succinate ER 25 milliGRAM(s) Oral daily  NIFEdipine XL 30 milliGRAM(s) Oral every 12 hours  polyethylene glycol 3350 17 Gram(s) Oral two times a day  predniSONE   Tablet 40 milliGRAM(s) Oral daily    PRN MEDICATIONS  acetaminophen     Tablet .. 650 milliGRAM(s) Oral every 6 hours PRN  dextrose Oral Gel 15 Gram(s) Oral once PRN                                            ------------------------------------------------------------  VITAL SIGNS: Last 24 Hours  T(C): 36.3 (02 Sep 2024 07:00), Max: 36.5 (01 Sep 2024 15:00)  T(F): 97.4 (02 Sep 2024 07:00), Max: 97.7 (01 Sep 2024 15:00)  HR: 81 (02 Sep 2024 07:00) (78 - 96)  BP: 165/89 (02 Sep 2024 07:00) (151/72 - 165/89)  BP(mean): --  RR: 17 (02 Sep 2024 07:00) (17 - 18)  SpO2: 97% (02 Sep 2024 07:00) (97% - 99%)                                             --------------------------------------------------------------  LABS:                        12.3   5.99  )-----------( 216      ( 02 Sep 2024 08:04 )             35.7     09-02    142  |  104  |  11  ----------------------------<  141<H>  3.5   |  26  |  0.7    Ca    9.6      02 Sep 2024 08:04  Mg     1.8     09-02        Urinalysis Basic - ( 02 Sep 2024 08:04 )    Color: x / Appearance: x / SG: x / pH: x  Gluc: 141 mg/dL / Ketone: x  / Bili: x / Urobili: x   Blood: x / Protein: x / Nitrite: x   Leuk Esterase: x / RBC: x / WBC x   Sq Epi: x / Non Sq Epi: x / Bacteria: x                                                            -------------------------------------------------------------  RADIOLOGY:    Xray Elbow AP + Lateral, Left (08.30.24 @ 02:05) >  Questionable elbow joint effusion. No definite fracture or osteomyelitis.   No radiopaque foreign body.                                              --------------------------------------------------------------    PHYSICAL EXAM:  GENERAL: NAD, lying in bed comfortably  HEAD:  Atraumatic, Normocephalic  EYES: EOMI, conjunctiva and sclera clear  ENT: Moist mucous membranes  NECK: Supple, No JVD  CHEST/LUNG: Clear to auscultation bilaterally; No rales, rhonchi, wheezing, or rubs. Unlabored respirations  HEART: regular rate and rhythm; No murmurs, rubs, or gallops  ABDOMEN: Bowel sounds present; Soft, Nontender, Nondistended.    EXTREMITIES: Warm. No clubbing, cyanosis, or edema  NERVOUS SYSTEM:  Alert & Oriented X3. No focal deficits   SKIN: No rashes or lesions                                           --------------------------------------------------------------                
patient seen and examined earlier today and again in the afternoon.  discussed also wtih daughter at bedside.  still has pain left elbow but improved.  no fever. no chills.  Vital Signs Last 24 Hrs  T(C): 36.3 (01 Sep 2024 07:00), Max: 36.6 (31 Aug 2024 17:43)  T(F): 97.3 (01 Sep 2024 07:00), Max: 97.8 (31 Aug 2024 17:43)  HR: 81 (01 Sep 2024 07:00) (77 - 93)  BP: 103/71 (01 Sep 2024 07:00) (103/71 - 147/70)  BP(mean): --  RR: 20 (01 Sep 2024 07:00) (19 - 20)  SpO2: 98% (01 Sep 2024 07:00) (98% - 99%)    Parameters below as of 01 Sep 2024 07:00  Patient On (Oxygen Delivery Method): room air     conj pink, no jaundice  neck supple. no JVD  lungs clear to auscultation. no crackles. no wheezing  heart irregular rate. no murmur or gallop  abd. soft nontender BS pos.  extr LUE elbow tender. left lower arm bruised and swollen but markedly improved compared to yesterday. patient has improved ROM of the left arm without limitation today by pain.                                 11.8   7.48  )-----------( 212      ( 01 Sep 2024 08:18 )             34.7   09-01    139  |  101  |  12  ----------------------------<  116<H>  3.2<L>   |  25  |  0.6<L>    Ca    9.3      01 Sep 2024 08:18  Phos  3.0     08-31  Mg     1.9     09-01    CAPILLARY BLOOD GLUCOSE      POCT Blood Glucose.: 109 mg/dL (01 Sep 2024 11:21)  POCT Blood Glucose.: 122 mg/dL (01 Sep 2024 07:29)  POCT Blood Glucose.: 145 mg/dL (31 Aug 2024 21:10)    MEDICATIONS  (STANDING):  ceFAZolin   IVPB 1000 milliGRAM(s) IV Intermittent every 8 hours  dabigatran 150 milliGRAM(s) Oral every 12 hours  dextrose 5%. 1000 milliLiter(s) (50 mL/Hr) IV Continuous <Continuous>  dextrose 5%. 1000 milliLiter(s) (100 mL/Hr) IV Continuous <Continuous>  dextrose 50% Injectable 12.5 Gram(s) IV Push once  dextrose 50% Injectable 25 Gram(s) IV Push once  dextrose 50% Injectable 25 Gram(s) IV Push once  enzalutamide 160 milliGRAM(s) Oral daily  glucagon  Injectable 1 milliGRAM(s) IntraMuscular once  insulin lispro (ADMELOG) corrective regimen sliding scale   SubCutaneous three times a day before meals  insulin lispro Injectable (ADMELOG) 5.5 Unit(s) SubCutaneous three times a day before meals  losartan 100 milliGRAM(s) Oral daily  metoprolol succinate ER 25 milliGRAM(s) Oral daily  NIFEdipine XL 30 milliGRAM(s) Oral every 12 hours  predniSONE   Tablet 40 milliGRAM(s) Oral daily    MEDICATIONS  (PRN):  acetaminophen     Tablet .. 650 milliGRAM(s) Oral every 6 hours PRN Temp greater or equal to 38C (100.4F), Mild Pain (1 - 3)  dextrose Oral Gel 15 Gram(s) Oral once PRN Blood Glucose LESS THAN 70 milliGRAM(s)/deciliter

## 2024-09-03 NOTE — DISCHARGE NOTE NURSING/CASE MANAGEMENT/SOCIAL WORK - PATIENT PORTAL LINK FT
You can access the FollowMyHealth Patient Portal offered by [facility] by registering at the following website: [email or web address]. By joining [employer]’s FollowMyHealth portal, you will also be able to view your health information using other applications (apps) compatible with our system.

## 2024-09-04 LAB
CULTURE RESULTS: SIGNIFICANT CHANGE UP
CULTURE RESULTS: SIGNIFICANT CHANGE UP
SPECIMEN SOURCE: SIGNIFICANT CHANGE UP
SPECIMEN SOURCE: SIGNIFICANT CHANGE UP

## 2024-09-06 ENCOUNTER — OUTPATIENT (OUTPATIENT)
Dept: OUTPATIENT SERVICES | Facility: HOSPITAL | Age: 84
LOS: 1 days | End: 2024-09-06
Payer: MEDICARE

## 2024-09-06 ENCOUNTER — NON-APPOINTMENT (OUTPATIENT)
Age: 84
End: 2024-09-06

## 2024-09-06 ENCOUNTER — APPOINTMENT (OUTPATIENT)
Dept: RADIATION ONCOLOGY | Facility: HOSPITAL | Age: 84
End: 2024-09-06
Payer: MEDICARE

## 2024-09-06 VITALS
BODY MASS INDEX: 25.79 KG/M2 | TEMPERATURE: 98.1 F | RESPIRATION RATE: 16 BRPM | DIASTOLIC BLOOD PRESSURE: 75 MMHG | HEART RATE: 87 BPM | WEIGHT: 190.13 LBS | SYSTOLIC BLOOD PRESSURE: 146 MMHG | OXYGEN SATURATION: 99 %

## 2024-09-06 DIAGNOSIS — C79.51 SECONDARY MALIGNANT NEOPLASM OF BONE: ICD-10-CM

## 2024-09-06 DIAGNOSIS — C61 MALIGNANT NEOPLASM OF PROSTATE: ICD-10-CM

## 2024-09-06 PROCEDURE — 99212 OFFICE O/P EST SF 10 MIN: CPT

## 2024-09-08 NOTE — HISTORY OF PRESENT ILLNESS
[FreeTextEntry1] : 09/06/2024: Follow up; Mr. Torre received 3/3 tx - 2400 cGy to the sacrum from 7/23/2024 - 7/29/2024 The patient is doing well; he recently was hospitalized for cellulitis/gout to the left arm from August 29th, 2024 - Sept. 3rd,2024, he is doing much better, his arm is healing nicely. The patient states, since having radiation to the sacrum his pain to his back has been much better.  He ambulates with a cane and is able to sit and stand easier then prior to radiation.  At present, he denies difficulty urinating, nausea, vomiting or diarrhea, appetite good, he performs most of his ADLs without difficulty.   His next ADT was due this week but since he was hospitalized, he'll have it next week @ Dr. Marsh's office. No new concerns.   7/23/2024: OTV First treatment out of 3 treatments to sacrum. Patient did not even realize he got his first treatment today. He did not feel any discomfort with the set up. He still has some back pain when he walks. His pain is about a 7/10. He takes Tylenol prn and it helps somewhat. His BP slightly elevated at 182/88 today. He has seen his PCP and his metoprolol was increased to 50mg as of today. He follows up next week with PCP.

## 2024-09-08 NOTE — DISEASE MANAGEMENT
[TTNM] : x [NTNM] : x [MTNM] : x [de-identified] : 2400cGy [de-identified] : Sacrum [de-identified] : 2400cGy

## 2024-09-08 NOTE — DISEASE MANAGEMENT
[TTNM] : x [NTNM] : x [MTNM] : x [de-identified] : 2400cGy [de-identified] : 2400cGy [de-identified] : Sacrum

## 2024-09-08 NOTE — DISEASE MANAGEMENT
[TTNM] : x [NTNM] : x [MTNM] : x [de-identified] : 2400cGy [de-identified] : 2400cGy [de-identified] : Sacrum

## 2024-09-09 DIAGNOSIS — L03.114 CELLULITIS OF LEFT UPPER LIMB: ICD-10-CM

## 2024-09-09 DIAGNOSIS — I48.19 OTHER PERSISTENT ATRIAL FIBRILLATION: ICD-10-CM

## 2024-09-09 DIAGNOSIS — E78.5 HYPERLIPIDEMIA, UNSPECIFIED: ICD-10-CM

## 2024-09-09 DIAGNOSIS — Z79.01 LONG TERM (CURRENT) USE OF ANTICOAGULANTS: ICD-10-CM

## 2024-09-09 DIAGNOSIS — E11.40 TYPE 2 DIABETES MELLITUS WITH DIABETIC NEUROPATHY, UNSPECIFIED: ICD-10-CM

## 2024-09-09 DIAGNOSIS — C61 MALIGNANT NEOPLASM OF PROSTATE: ICD-10-CM

## 2024-09-09 DIAGNOSIS — S51.812A LACERATION WITHOUT FOREIGN BODY OF LEFT FOREARM, INITIAL ENCOUNTER: ICD-10-CM

## 2024-09-09 DIAGNOSIS — I45.10 UNSPECIFIED RIGHT BUNDLE-BRANCH BLOCK: ICD-10-CM
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
## 2024-09-09 DIAGNOSIS — Z79.84 LONG TERM (CURRENT) USE OF ORAL HYPOGLYCEMIC DRUGS: ICD-10-CM

## 2024-09-09 DIAGNOSIS — M10.9 GOUT, UNSPECIFIED: ICD-10-CM

## 2024-09-09 DIAGNOSIS — I10 ESSENTIAL (PRIMARY) HYPERTENSION: ICD-10-CM
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
## 2024-09-09 DIAGNOSIS — C79.51 SECONDARY MALIGNANT NEOPLASM OF BONE: ICD-10-CM

## 2024-09-09 DIAGNOSIS — E83.42 HYPOMAGNESEMIA: ICD-10-CM

## 2024-09-09 DIAGNOSIS — D84.81 IMMUNODEFICIENCY DUE TO CONDITIONS CLASSIFIED ELSEWHERE: ICD-10-CM

## 2024-09-11 DIAGNOSIS — C79.51 SECONDARY MALIGNANT NEOPLASM OF BONE: ICD-10-CM

## 2024-09-12 ENCOUNTER — NON-APPOINTMENT (OUTPATIENT)
Age: 84
End: 2024-09-12

## 2024-09-23 ENCOUNTER — LABORATORY RESULT (OUTPATIENT)
Age: 84
End: 2024-09-23

## 2024-09-23 ENCOUNTER — APPOINTMENT (OUTPATIENT)
Age: 84
End: 2024-09-23
Payer: MEDICARE

## 2024-09-23 ENCOUNTER — OUTPATIENT (OUTPATIENT)
Dept: OUTPATIENT SERVICES | Facility: HOSPITAL | Age: 84
LOS: 1 days | End: 2024-09-23
Payer: MEDICARE

## 2024-09-23 VITALS
HEIGHT: 72 IN | HEART RATE: 80 BPM | WEIGHT: 187 LBS | SYSTOLIC BLOOD PRESSURE: 150 MMHG | RESPIRATION RATE: 16 BRPM | DIASTOLIC BLOOD PRESSURE: 85 MMHG | TEMPERATURE: 97.9 F | BODY MASS INDEX: 25.33 KG/M2

## 2024-09-23 DIAGNOSIS — Z51.12 ENCOUNTER FOR ANTINEOPLASTIC CHEMOTHERAPY: ICD-10-CM

## 2024-09-23 DIAGNOSIS — Z51.11 ENCOUNTER FOR ANTINEOPLASTIC CHEMOTHERAPY: ICD-10-CM

## 2024-09-23 DIAGNOSIS — C61 MALIGNANT NEOPLASM OF PROSTATE: ICD-10-CM

## 2024-09-23 PROBLEM — E78.5 HYPERLIPIDEMIA, UNSPECIFIED: Chronic | Status: ACTIVE | Noted: 2024-08-30

## 2024-09-23 PROBLEM — I48.91 UNSPECIFIED ATRIAL FIBRILLATION: Chronic | Status: ACTIVE | Noted: 2024-08-30

## 2024-09-23 PROBLEM — E11.9 TYPE 2 DIABETES MELLITUS WITHOUT COMPLICATIONS: Chronic | Status: ACTIVE | Noted: 2024-08-30

## 2024-09-23 PROBLEM — I10 ESSENTIAL (PRIMARY) HYPERTENSION: Chronic | Status: ACTIVE | Noted: 2024-08-30

## 2024-09-23 LAB
HCT VFR BLD CALC: 39.9 %
HGB BLD-MCNC: 13.7 G/DL
MCHC RBC-ENTMCNC: 31.7 PG
MCHC RBC-ENTMCNC: 34.3 G/DL
MCV RBC AUTO: 92.4 FL
PLATELET # BLD AUTO: 231 K/UL
PMV BLD: 10.2 FL
RBC # BLD: 4.32 M/UL
RBC # FLD: 14.4 %
WBC # FLD AUTO: 6.09 K/UL

## 2024-09-23 PROCEDURE — 99214 OFFICE O/P EST MOD 30 MIN: CPT

## 2024-09-23 PROCEDURE — 80053 COMPREHEN METABOLIC PANEL: CPT

## 2024-09-23 PROCEDURE — G0103: CPT

## 2024-09-23 PROCEDURE — 84403 ASSAY OF TOTAL TESTOSTERONE: CPT

## 2024-09-23 PROCEDURE — G2211 COMPLEX E/M VISIT ADD ON: CPT

## 2024-09-23 PROCEDURE — 85027 COMPLETE CBC AUTOMATED: CPT

## 2024-09-23 RX ORDER — NIFEDIPINE 60 MG
60 TABLET, EXTENDED RELEASE ORAL
Refills: 0 | Status: ACTIVE | COMMUNITY

## 2024-09-23 NOTE — ASSESSMENT
[Palliative] : Goals of care discussed with patient: Palliative [Patient/Caregiver not ready to engage] : Patient/Caregiver not ready to engage [FreeTextEntry1] : # Recurrent prostate cancer.  Foundation one Liquid: Biomarker Findings Blood Tumor Mutational [place] - 4 Muts/Mb ctDNA Tumor Fraction - Low (< 1.0%) Microsatellite status - MSI-High Not Detected Genomic Findings For a complete list of the genes assayed, please refer to the Appendix. 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  - 2022 PSMA PET due to rising PSA - showing metastatic disease in the retroperitoneal and pelvic subcentimeter lymph nodes - Stage IV. - 01/10/2023 started Firmagon. - 02/14/2023 started Eligard 45 mg IM Q6M by Urology. - 08/20/2023 started Prolia. - 11/16/2023 ordered Nubequa for a month - but was too expensive as well as Erleda. - 12/27/2023 trial of Xtandi for 1 month - is costing him at this $600 a month and may be more starting January 2024. - 12/27/2023 PSA 1.76. - Nubequa was ordered again but insurance wanted to administer this with Taxotere.  - He will finish off his Xtandi since he has a month worth and then will switch to abiraterone. - 01/22/2024 started abiraterone 250 mg PO QD with prednisone 5 mg PO BID. - 06/04/2024 PSMA - Primary disease: Status post radical prostatectomy. No abnormal focal increased activity in the prostate surgical bed to suggest locoregional recurrence. Zee disease: No evidence of PSMA avid lymphadenopathy.  Interval complete resolution of previously seen PSMA avid retroperitoneal and pelvic lymph nodes. Metastasis:  New focal increased uptake in the sacrum, consistent with osseous metastasis.  Multiple new non-tracer avid subcentimeter sclerotic foci, possibly inactive osseous metastasis. - 07/2024 radiation to sacrum. -started Xtandi 8/2024    # Peripheral Neuropathy - chronic, diagnosed a long time ago.  # Smoking about 1 pack per day - declined smoking cessation.  # IPMN - will monitor with imaging.    PLAN: - started in 8/2024 Xtandi / [id number] mg PO QD continue until disease progression or unacceptable toxicity.  now Pradaxa / Dabigatran 150 mg PO BID since it has less interaction with Xtandi - continue Eligard Q6M and Prolia with urology. - continue Folic Acid 1 mg PO QD. - continue Calcium PO Q12H. - continue Vit.D 50K PO QW. - repeat CT C/A/P - ~12/2024 since just started Xtandi  - again, he was strongly advised to quit drinking, but decided not to adhere. - declined smoking cessation - will encourage. - Labs today: CBC CMP PSA. RTC in 6-8 weeks   CBC CMP PSA testosterone and uric acid [AdvancecareDate] : 12/27/2023

## 2024-09-23 NOTE — HISTORY OF PRESENT ILLNESS
[N: ___] : N[unfilled] [AJCC Stage: ____] : AJCC Stage: [unfilled] [de-identified] : CC: I have prostate cancer  He is here at the request of Dr. Venkat Stockton  This is an 83-year-old male with a history of neuropathy, HTN, DM2, afib, prostate adenocarcinoma, for which she had a radical prostatectomy in 2004 complicated by mild stress incontinence. He was noted to have rising PSA he underwent a PSMA PET/CT that demonstrated activity in his subcentimeter retroperitoneal and pelvic lymph nodes. He received 6 months shot of Eligard in August 20 to 23 and also was started on Prolia. Nubequa was ordered but this was too expensive. Erleda and Xtandi were also tried but they were expansive as well. Nubequa was ordered again but insurance wanted to administer this with Taxotere.   They were able to obtain Xtandi and he is on it now.  Review of blood work  PSA       date 1.82 March 2022 17.26        November 2022 22.82       December 2022 3.86         April 2023 13.84        August 2023 1.76         December 2023  Firmagon 1/10/2023 Eligard started 8/22/2023 He took Nubequa for a month started 9/2023 He started Xtandi about one week ago  Blood work from December 18, 2023 CBC is fine, CMP is normal as well, PSA as mentioned 1.76  PSMA PET/CT scan July 28, 2022 PSMA avid subcentimeter retroperitoneal and pelvic lymph nodes highly suspicious for metastatic disease [de-identified] : 02/07/2024 accompanied by his two daughters; Reports feeling well, except for neuropathy "pins and needles" at b/l below knees; no changes in chronic conditions or new complaints since the last visit. He still smokes 1 pack per day and consumes "vodka with club Soda "not much" three time per week - he is not interested to quit at this time.  05/09/2024 accompanied by his two daughters; Reports increasing depression.  06/20/2024 accompanied by his two daughters; Reports feeling well at the baseline of his health status, no changes in chronic conditions except for increasing back pains S/P his last fall in past.  8/5/2024 He is here for follow up. He had radiation to sacrum for palliation - not much improvement.....   9/23/24 He is here for follow up.  Overall he is feeling much better now there is no longer have any back pain.  He is tolerating Xtandi well.  Reports most recent PSA with Dr. Painting was less than 1.  He reports some pain and possible mass in his right chest on examination I did not feel any masses may be he is developing gynecomastia leading to pain.  Otherwise he remains on Eligard and Xgeva with Dr. Stockton.  He had an elevator door closed on his arm and then was hospitalized it was unclear if he had cellulitis although was treated for it there was also likely hematoma also had severe pain in his elbow maybe was due to gout although was not proven.  But everything is much better now

## 2024-09-23 NOTE — PHYSICAL EXAM
[Fully active, able to carry on all pre-disease performance without restriction] : Status 0 - Fully active, able to carry on all pre-disease performance without restriction [Normal] : affect appropriate [de-identified] : b/l hearing aids

## 2024-09-24 DIAGNOSIS — C61 MALIGNANT NEOPLASM OF PROSTATE: ICD-10-CM

## 2024-09-24 LAB
ALBUMIN SERPL ELPH-MCNC: 4.1 G/DL
ALP BLD-CCNC: 90 U/L
ALT SERPL-CCNC: 6 U/L
ANION GAP SERPL CALC-SCNC: 14 MMOL/L
AST SERPL-CCNC: 11 U/L
BILIRUB SERPL-MCNC: 0.6 MG/DL
BUN SERPL-MCNC: 13 MG/DL
CALCIUM SERPL-MCNC: 10.5 MG/DL
CHLORIDE SERPL-SCNC: 104 MMOL/L
CO2 SERPL-SCNC: 22 MMOL/L
CREAT SERPL-MCNC: 0.8 MG/DL
EGFR: 87 ML/MIN/1.73M2
GLUCOSE SERPL-MCNC: 102 MG/DL
POTASSIUM SERPL-SCNC: 5.1 MMOL/L
PROT SERPL-MCNC: 7 G/DL
PSA SERPL-MCNC: 0.93 NG/ML
SODIUM SERPL-SCNC: 140 MMOL/L
TESTOST SERPL-MCNC: <2.5 NG/DL

## 2024-10-10 ENCOUNTER — EMERGENCY (EMERGENCY)
Facility: HOSPITAL | Age: 84
LOS: 0 days | Discharge: HOME | End: 2024-10-10
Attending: EMERGENCY MEDICINE
Payer: MEDICARE

## 2024-10-10 VITALS
HEIGHT: 72 IN | SYSTOLIC BLOOD PRESSURE: 117 MMHG | RESPIRATION RATE: 18 BRPM | WEIGHT: 181 LBS | DIASTOLIC BLOOD PRESSURE: 75 MMHG | OXYGEN SATURATION: 98 % | TEMPERATURE: 98 F | HEART RATE: 71 BPM

## 2024-10-10 DIAGNOSIS — R07.89 OTHER CHEST PAIN: ICD-10-CM

## 2024-10-10 DIAGNOSIS — I10 ESSENTIAL (PRIMARY) HYPERTENSION: ICD-10-CM

## 2024-10-10 DIAGNOSIS — E11.40 TYPE 2 DIABETES MELLITUS WITH DIABETIC NEUROPATHY, UNSPECIFIED: ICD-10-CM

## 2024-10-10 DIAGNOSIS — Z85.46 PERSONAL HISTORY OF MALIGNANT NEOPLASM OF PROSTATE: ICD-10-CM

## 2024-10-10 DIAGNOSIS — I48.91 UNSPECIFIED ATRIAL FIBRILLATION: ICD-10-CM

## 2024-10-10 DIAGNOSIS — E78.5 HYPERLIPIDEMIA, UNSPECIFIED: ICD-10-CM

## 2024-10-10 DIAGNOSIS — F10.129 ALCOHOL ABUSE WITH INTOXICATION, UNSPECIFIED: ICD-10-CM

## 2024-10-10 DIAGNOSIS — M10.9 GOUT, UNSPECIFIED: ICD-10-CM

## 2024-10-10 DIAGNOSIS — Z79.01 LONG TERM (CURRENT) USE OF ANTICOAGULANTS: ICD-10-CM
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
## 2024-10-10 DIAGNOSIS — R07.81 PLEURODYNIA: ICD-10-CM
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
## 2024-10-10 LAB
ALBUMIN SERPL ELPH-MCNC: 4.2 G/DL — SIGNIFICANT CHANGE UP (ref 3.5–5.2)
ALP SERPL-CCNC: 82 U/L — SIGNIFICANT CHANGE UP (ref 30–115)
ALT FLD-CCNC: 5 U/L — SIGNIFICANT CHANGE UP (ref 0–41)
ANION GAP SERPL CALC-SCNC: 17 MMOL/L — HIGH (ref 7–14)
APPEARANCE UR: CLEAR — SIGNIFICANT CHANGE UP
APTT BLD: 62.5 SEC — HIGH (ref 27–39.2)
AST SERPL-CCNC: 12 U/L — SIGNIFICANT CHANGE UP (ref 0–41)
BASOPHILS # BLD AUTO: 0.05 K/UL — SIGNIFICANT CHANGE UP (ref 0–0.2)
BASOPHILS NFR BLD AUTO: 0.8 % — SIGNIFICANT CHANGE UP (ref 0–1)
BILIRUB SERPL-MCNC: 0.3 MG/DL — SIGNIFICANT CHANGE UP (ref 0.2–1.2)
BILIRUB UR-MCNC: NEGATIVE — SIGNIFICANT CHANGE UP
BUN SERPL-MCNC: 16 MG/DL — SIGNIFICANT CHANGE UP (ref 10–20)
CALCIUM SERPL-MCNC: 9.6 MG/DL — SIGNIFICANT CHANGE UP (ref 8.4–10.5)
CHLORIDE SERPL-SCNC: 102 MMOL/L — SIGNIFICANT CHANGE UP (ref 98–110)
CO2 SERPL-SCNC: 17 MMOL/L — SIGNIFICANT CHANGE UP (ref 17–32)
COLOR SPEC: YELLOW — SIGNIFICANT CHANGE UP
CREAT SERPL-MCNC: 0.6 MG/DL — LOW (ref 0.7–1.5)
DIFF PNL FLD: NEGATIVE — SIGNIFICANT CHANGE UP
EGFR: 95 ML/MIN/1.73M2 — SIGNIFICANT CHANGE UP
EOSINOPHIL # BLD AUTO: 0.27 K/UL — SIGNIFICANT CHANGE UP (ref 0–0.7)
EOSINOPHIL NFR BLD AUTO: 4.3 % — SIGNIFICANT CHANGE UP (ref 0–8)
ETHANOL SERPL-MCNC: 195 MG/DL — HIGH
GLUCOSE SERPL-MCNC: 99 MG/DL — SIGNIFICANT CHANGE UP (ref 70–99)
GLUCOSE UR QL: NEGATIVE MG/DL — SIGNIFICANT CHANGE UP
HCT VFR BLD CALC: 41.8 % — LOW (ref 42–52)
HGB BLD-MCNC: 14.5 G/DL — SIGNIFICANT CHANGE UP (ref 14–18)
IMM GRANULOCYTES NFR BLD AUTO: 0.5 % — HIGH (ref 0.1–0.3)
INR BLD: 1.1 RATIO — SIGNIFICANT CHANGE UP (ref 0.65–1.3)
KETONES UR-MCNC: NEGATIVE MG/DL — SIGNIFICANT CHANGE UP
LACTATE SERPL-SCNC: 5.2 MMOL/L — CRITICAL HIGH (ref 0.7–2)
LEUKOCYTE ESTERASE UR-ACNC: NEGATIVE — SIGNIFICANT CHANGE UP
LIDOCAIN IGE QN: 61 U/L — HIGH (ref 7–60)
LYMPHOCYTES # BLD AUTO: 1.85 K/UL — SIGNIFICANT CHANGE UP (ref 1.2–3.4)
LYMPHOCYTES # BLD AUTO: 29.4 % — SIGNIFICANT CHANGE UP (ref 20.5–51.1)
MCHC RBC-ENTMCNC: 32.4 PG — HIGH (ref 27–31)
MCHC RBC-ENTMCNC: 34.7 G/DL — SIGNIFICANT CHANGE UP (ref 32–37)
MCV RBC AUTO: 93.5 FL — SIGNIFICANT CHANGE UP (ref 80–94)
MONOCYTES # BLD AUTO: 0.66 K/UL — HIGH (ref 0.1–0.6)
MONOCYTES NFR BLD AUTO: 10.5 % — HIGH (ref 1.7–9.3)
NEUTROPHILS # BLD AUTO: 3.43 K/UL — SIGNIFICANT CHANGE UP (ref 1.4–6.5)
NEUTROPHILS NFR BLD AUTO: 54.5 % — SIGNIFICANT CHANGE UP (ref 42.2–75.2)
NITRITE UR-MCNC: NEGATIVE — SIGNIFICANT CHANGE UP
NRBC # BLD: 0 /100 WBCS — SIGNIFICANT CHANGE UP (ref 0–0)
PH UR: 5.5 — SIGNIFICANT CHANGE UP (ref 5–8)
PLATELET # BLD AUTO: 232 K/UL — SIGNIFICANT CHANGE UP (ref 130–400)
PMV BLD: 10.2 FL — SIGNIFICANT CHANGE UP (ref 7.4–10.4)
POTASSIUM SERPL-MCNC: 4.2 MMOL/L — SIGNIFICANT CHANGE UP (ref 3.5–5)
POTASSIUM SERPL-SCNC: 4.2 MMOL/L — SIGNIFICANT CHANGE UP (ref 3.5–5)
PROT SERPL-MCNC: 7.1 G/DL — SIGNIFICANT CHANGE UP (ref 6–8)
PROT UR-MCNC: NEGATIVE MG/DL — SIGNIFICANT CHANGE UP
PROTHROM AB SERPL-ACNC: 12.6 SEC — SIGNIFICANT CHANGE UP (ref 9.95–12.87)
RBC # BLD: 4.47 M/UL — LOW (ref 4.7–6.1)
RBC # FLD: 15.1 % — HIGH (ref 11.5–14.5)
SODIUM SERPL-SCNC: 136 MMOL/L — SIGNIFICANT CHANGE UP (ref 135–146)
SP GR SPEC: 1.01 — SIGNIFICANT CHANGE UP (ref 1–1.03)
UROBILINOGEN FLD QL: 0.2 MG/DL — SIGNIFICANT CHANGE UP (ref 0.2–1)
WBC # BLD: 6.29 K/UL — SIGNIFICANT CHANGE UP (ref 4.8–10.8)
WBC # FLD AUTO: 6.29 K/UL — SIGNIFICANT CHANGE UP (ref 4.8–10.8)

## 2024-10-10 PROCEDURE — 72170 X-RAY EXAM OF PELVIS: CPT

## 2024-10-10 PROCEDURE — 83690 ASSAY OF LIPASE: CPT

## 2024-10-10 PROCEDURE — 70450 CT HEAD/BRAIN W/O DYE: CPT | Mod: MC

## 2024-10-10 PROCEDURE — 72125 CT NECK SPINE W/O DYE: CPT | Mod: MC

## 2024-10-10 PROCEDURE — 71045 X-RAY EXAM CHEST 1 VIEW: CPT

## 2024-10-10 PROCEDURE — 85610 PROTHROMBIN TIME: CPT

## 2024-10-10 PROCEDURE — 99291 CRITICAL CARE FIRST HOUR: CPT | Mod: 25

## 2024-10-10 PROCEDURE — 72170 X-RAY EXAM OF PELVIS: CPT | Mod: 26

## 2024-10-10 PROCEDURE — 36415 COLL VENOUS BLD VENIPUNCTURE: CPT

## 2024-10-10 PROCEDURE — 70450 CT HEAD/BRAIN W/O DYE: CPT | Mod: 26,MC

## 2024-10-10 PROCEDURE — 99285 EMERGENCY DEPT VISIT HI MDM: CPT | Mod: GC

## 2024-10-10 PROCEDURE — 71260 CT THORAX DX C+: CPT | Mod: 26,MC

## 2024-10-10 PROCEDURE — 80053 COMPREHEN METABOLIC PANEL: CPT

## 2024-10-10 PROCEDURE — 85730 THROMBOPLASTIN TIME PARTIAL: CPT

## 2024-10-10 PROCEDURE — 81003 URINALYSIS AUTO W/O SCOPE: CPT

## 2024-10-10 PROCEDURE — 82962 GLUCOSE BLOOD TEST: CPT

## 2024-10-10 PROCEDURE — 71260 CT THORAX DX C+: CPT | Mod: MC

## 2024-10-10 PROCEDURE — 80307 DRUG TEST PRSMV CHEM ANLYZR: CPT

## 2024-10-10 PROCEDURE — 85025 COMPLETE CBC W/AUTO DIFF WBC: CPT

## 2024-10-10 PROCEDURE — 99285 EMERGENCY DEPT VISIT HI MDM: CPT

## 2024-10-10 PROCEDURE — 74177 CT ABD & PELVIS W/CONTRAST: CPT | Mod: MC

## 2024-10-10 PROCEDURE — 36000 PLACE NEEDLE IN VEIN: CPT

## 2024-10-10 PROCEDURE — 83605 ASSAY OF LACTIC ACID: CPT

## 2024-10-10 PROCEDURE — 74177 CT ABD & PELVIS W/CONTRAST: CPT | Mod: 26,MC

## 2024-10-10 PROCEDURE — 71045 X-RAY EXAM CHEST 1 VIEW: CPT | Mod: 26

## 2024-10-10 PROCEDURE — 72125 CT NECK SPINE W/O DYE: CPT | Mod: 26,MC

## 2024-10-10 RX ORDER — SODIUM CHLORIDE 0.9 % (FLUSH) 0.9 %
250 SYRINGE (ML) INJECTION ONCE
Refills: 0 | Status: DISCONTINUED | OUTPATIENT
Start: 2024-10-10 | End: 2024-10-10

## 2024-10-10 NOTE — ED PROVIDER NOTE - PHYSICAL EXAMINATION
Initial vital signs reviewed.    Airway: patent  Breathing: clear breath sounds bilaterally.  Circulatory: 2+ and equal distal pulses x4.    General: NAD, nontoxic appearing.  HENT: AT/NC. No baldwin signs, raccoon eyes, or skull deformity. TM clear b/l without hemotympanum. Oropharynx clear without evidence of trauma.  Eyes: non-injected conjunctivae b/l. PERRLA b/l. EOMI b/l.  Neck: supple. No midline cervical stepoff or tenderness.  CV: RRR, no murmurs.  Pulm: nonlabored work of breathing, CTAB. No chest wall tenderness to palpation or crepitus.  Abd: soft, nondistended, nontender.  MSK: no joint deformity. No pelvis instability. No tenderness to palpation in extremities x4. No T/L/S midline spinal tenderness or stepoffs.  Skin: warm, dry, well-perfused. No obvious lacerations or active bleeding.  Neuro: A&Ox4.  Psych: appropriate mood and affect.

## 2024-10-10 NOTE — ED PROVIDER NOTE - NSFOLLOWUPINSTRUCTIONS_ED_ALL_ED_FT
Understanding Your Risk for Falls  Millions of people have serious injuries from falls each year. It is important to understand your risk of falling. Talk with your health care provider about your risk and what you can do to lower it.    If you do have a serious fall, make sure to tell your provider. Falling once raises your risk of falling again.    How can falls affect me?  Serious injuries from falls are common. These include:  Broken bones, such as hip fractures.  Head injuries, such as traumatic brain injuries (TBI) or concussions.  A fear of falling can cause you to avoid activities and stay at home. This can make your muscles weaker and raise your risk for a fall.    What can increase my risk?  There are a number of risk factors that increase your risk for falling. The more risk factors you have, the higher your risk of falling. Serious injuries from a fall happen most often to people who are older than 65 years old. Teenagers and young adults ages 15–29 are also at higher risk.    Common risk factors include:  Weakness in the lower body.  Being generally weak or confused due to long-term (chronic) illness.  Dizziness or balance problems.  Poor vision.  Medicines that cause dizziness or drowsiness. These may include:  Medicines for your blood pressure, heart, anxiety, insomnia, or swelling (edema).  Pain medicines.  Muscle relaxants.  Other risk factors include:  Drinking alcohol.  Having had a fall in the past.  Having foot pain or wearing improper footwear.  Working at a dangerous job.  Having any of the following in your home:  Tripping hazards, such as floor clutter or loose rugs.  Poor lighting.  Pets.  Having dementia or memory loss.  What actions can I take to lower my risk of falling?  Shower and bathtub, showing safety grab bars on the walls.  A grab bar next to a toilet.  Physical activity    Stay physically fit. Do strength and balance exercises. Consider taking a regular class to build strength and balance. Yoga and marco antonio chi are good options.    Vision    Have your eyes checked every year and your prescription for glasses or contacts updated as needed.    Shoes and walking aids    Wear non-skid shoes.  Wear shoes that have rubber soles and low heels.  Do not wear high heels.  Do not walk around the house in socks or slippers.  Use a cane or walker as told by your provider.  Home safety    Attach secure railings on both sides of your stairs.  Install grab bars for your bathtub, shower, and toilet. Use a non-skid mat in your bathtub or shower. Attach bath mats securely with double-sided, non-slip rug tape.  Use good lighting in all rooms. Keep a flashlight near your bed.  Make sure there is a clear path from your bed to the bathroom. Use night-lights.  Do not use throw rugs. Make sure all carpeting is taped or tacked down securely.  Remove all clutter from walkways and stairways, including extension cords.  Repair uneven or broken steps and floors.  Avoid walking on icy or slippery surfaces. Walk on the grass instead of on icy or slick sidewalks. Use ice melter to get rid of ice on walkways in the winter.  Use a cordless phone.  Questions to ask your health care provider  Can you help me check my risk for a fall?  Do any of my medicines make me more likely to fall?  Should I take a vitamin D supplement?  What exercises can I do to improve my strength and balance?  Should I make an appointment to have my vision checked?  Do I need a bone density test to check for weak bones (osteoporosis)?  Would it help to use a cane or a walker?  Where to find more information  Centers for Disease Control and Prevention, [place]: cdc.gov  Community-Based Fall Prevention Programs: cdc.gov  National [place] on Aging: ricardo.nih.gov  Contact a health care provider if:  You fall at home.  You are afraid of falling at home.  You feel weak, drowsy, or dizzy.  This information is not intended to replace advice given to you by your health care provider. Make sure you discuss any questions you have with your health care provider.

## 2024-10-10 NOTE — ED PROVIDER NOTE - OBJECTIVE STATEMENT
84-year-old male with past medical history of prostate cancer on xtandi, atrial fibrillation on Pradaxa, diabetes with neuropathy, hypertension, hyperlipidemia, who was drinking at a bar and fell off of a barstool hitting his head without loss of consciousness.  States that he slipped and fell.  Denies any presyncopal symptoms.  Of note patient also complaining of right sided rib pain which has been going on for a while.  Denies any other injuries including numbness, tingling, weakness, chest pain, back pain, nausea/vomiting, dizziness, dyspnea.

## 2024-10-10 NOTE — CONSULT NOTE ADULT - ATTENDING COMMENTS
Trauma Attending Note Attestation  Patient was examined and evaluated at the bedside at 8:10 PM. Medications, radiological studies and all other relevant studies reviewed.     History as above. Admitted to having at least 4 alcoholic beverages before the fall.    Vital Signs Last 24 Hrs  T(C): 36.8 (10 Oct 2024 19:59), Max: 36.8 (10 Oct 2024 19:59)  T(F): 98.2 (10 Oct 2024 19:59), Max: 98.2 (10 Oct 2024 19:59)  HR: 71 (10 Oct 2024 19:59) (71 - 71)  BP: 117/75 (10 Oct 2024 19:59) (117/75 - 117/75)  BP(mean): --  RR: 18 (10 Oct 2024 19:59) (18 - 18)  SpO2: 98% (10 Oct 2024 19:59) (98% - 98%)    Parameters below as of 10 Oct 2024 19:59  Patient On (Oxygen Delivery Method): room air    Primary:  Airway - intact  Breathing - breath sounds bilaterally  Circulation - 2+ throughout  Disability - GCS 15, moving all extremities  Exposure - patient was exposed    I independently performed a medically appropriate exam. I have made revisions to the physical exam in the note above and agree with the exam as written.                          14.5   6.29  )-----------( 232      ( 10 Oct 2024 20:24 )             41.8     10-10    136  |  102  |  16  ----------------------------<  99  4.2   |  17  |  0.6[L]    Ca 9.6; Mg x ; Phos x       ( 10 Oct 2024 20:24 )  Alb: 4.2 g/dL / Pro: 7.1 g/dL / AlkPhos: 82 U/L / ALT: 5 U/L / AST: 12 U/L / GGT:x     / Tbili 0.3 mg/dL/ Dbili x     / Indbili x        PT/INR/PTT - ( 10 Oct 2024 20:24 )   PT: 12.60 sec; INR: 1.10 ratio; PTT 62.5 sec    Lactate, Blood: 5.2 mmol/L (10-10 @ 20:24)      CXR reviewed and interpreted by me - no hemothorax/pneumothorax  CTH/Csp reviewed and interpreted by me - no acute fractures or intracranial hemorrhage  CT C/A/P reviewed and interpreted by me - no acute traumatic injuries, age-indeterminate L1 fracture    Assessment/Plan:  84y Male PMH as above s/p fall with closed head injury while intoxicated. and anticoagulated on Pradaxa. No acute traumatic injuries requiring further work up, trauma surgery intervention, or admission to trauma service identified on imaging. L1 vertebral body compression fracture is chronic based on lack of back pain and lack of tenderness on exam. Disposition per ED.    Ha Rose MD  Trauma/Acute Care Surgery/Surgical Critical Care Attending

## 2024-10-10 NOTE — ED PROVIDER NOTE - PATIENT PORTAL LINK FT
You can access the FollowMyHealth Patient Portal offered by [facility] by registering at the following website: [email or web address]. By joining [employer]’s FollowMyHealth portal, you will also be able to view your health information using other applications (apps) compatible with our system.

## 2024-10-10 NOTE — ED PROVIDER NOTE - PROGRESS NOTE DETAILS
I, Dr. Salvatore Herrmann, discussed the case with trauma attending -Dr. Rose, agrees with management.. Jaydon Rios, DO: trauma workup without acute findings. incidental age-indeterminate compression fracture of L1, nontender spine. Ambulate with steady gait.  No focal neurological deficits.  Clear for discharge.

## 2024-10-10 NOTE — CONSULT NOTE ADULT - ASSESSMENT
84yM w/ PMHx of prostate CA on xtandi, afib on pradaxa, DM, diabetic neuropathy, gout, HTN, HLD seen as Trauma Alert s/p fall off barstool +HT, -LOC, +AC with complaint of right chest pain, no external signs of trauma. Trauma assessment in ED: ABCs intact , GCS 15 , AAOx3,  RUSSELL.     Injuries identified: **PENDING**  -   -   -     PLAN:   - Trauma Labs: (CBC, BMP, Coags, T&S, UA, EtOH level)  Additional studies:  EKG  Utox    Trauma Imaging to include the following:  - CXR, Pelvic Xray  - CT Head,  CT C-spine, CT Chest  - Extremity films: None    Additional consultations: **PENDING IMAGING**  - Neurosurgery  - Orthopedics  - OMFS  - PT/Rehab/SW  - Hospitalist/Medicine     Disposition pending results of above labs and imaging  Above plan discussed with Trauma attending, Dr. Rose, patient, patient family, and ED team  --------------------------------------------------------------------------------------  10-10-24 @ 20:42    SURGERY CONSULT SPECTRA: 6699 X1  TRAUMA SURGERY SPECTRA: 7340 84yM w/ PMHx of prostate CA on xtandi, afib on pradaxa, DM, diabetic neuropathy, gout, HTN, HLD seen as Trauma Alert s/p fall off barstool +HT, -LOC, +AC with complaint of right chest pain, no external signs of trauma. Trauma assessment in ED: ABCs intact , GCS 15 , AAOx3,  RUSSELL.     Injuries identified:  - age-indeterminate L1 compression fracture    PLAN:   - Trauma labs and imaging reviewed  - No further traumatic workup required    Disposition per ED  Above plan discussed with Trauma attending, Dr. Rose, patient, patient family, and ED team  --------------------------------------------------------------------------------------  10-10-24 @ 20:42    SURGERY CONSULT SPECTRA: 6699 X1  TRAUMA SURGERY SPECTRA: 0591

## 2024-10-10 NOTE — ED PROVIDER NOTE - WR INTERPRETATION 1
ED XR prelim, my independent interpretation - Dr. Salvatore Herrmann: No dislocation or no foreign body no fractures

## 2024-10-10 NOTE — ED PROVIDER NOTE - WR INTERPRETATION 2
ED CXR prelim, my independent interpretation - Dr. Salvatore Herrmann: No PTX, No infiltrates, No Free air

## 2024-10-10 NOTE — ED ADULT NURSE NOTE - NSFALLHARMRISKINTERV_ED_ALL_ED
Assistance OOB with selected safe patient handling equipment if applicable/Assistance with ambulation/Communicate risk of Fall with Harm to all staff, patient, and family/Monitor gait and stability/Monitor for mental status changes and reorient to person, place, and time, as needed/Provide visual cue: red socks, yellow wristband, yellow gown, etc/Reinforce activity limits and safety measures with patient and family/Toileting schedule using arm’s reach rule for commode and bathroom/Use of alarms - bed, stretcher, chair and/or video monitoring/Bed in lowest position, wheels locked, appropriate side rails in place/Call bell, personal items and telephone in reach/Instruct patient to call for assistance before getting out of bed/chair/stretcher/Non-slip footwear applied when patient is off stretcher/[place] to call system/Physically safe environment - no spills, clutter or unnecessary equipment/Purposeful Proactive Rounding/Room/bathroom lighting operational, light cord in reach

## 2024-10-10 NOTE — ED PROVIDER NOTE - CLINICAL SUMMARY MEDICAL DECISION MAKING FREE TEXT BOX
Head injury on anticoagulation.  No focal neurodeficits.  Trauma alert on arrival.  Labs and imaging reviewed.  Trauma consulted.  Reassessed.  Daughter at the bedside providing further history.

## 2024-10-10 NOTE — CONSULT NOTE ADULT - SUBJECTIVE AND OBJECTIVE BOX
TRAUMA ACTIVATION LEVEL:  ALERT  ACTIVATED BY: ED  INTUBATED: NO      MECHANISM OF INJURY:   [] Blunt     [] MVC	  [x] Fall	  [] Pedestrian Struck	  [] Motorcycle     [] Assault     [] Bicycle collision    [] Sports injury    [] Penetrating    [] Gun Shot Wound      [] Stab Wound    GCS: 15 	E: 4	V: 5	M: 6    HPI: 84yM w/ PMHx of prostate CA on xtandi, afib on pradaxa, DM, diabetic neuropathy, gout, HTN, HLD seen as Trauma Alert s/p fall off barstool +HT, -LOC, +AC. Patient reports he was at a bar when his foot slipped off the barstool, causing him to fall over onto the ground. Admits to hitting his head, but denies LOC. Per EMS, patient was unable to get up off the ground after the fall. Complaining of pain to the right lateral chest. No other complaints of pain at this time. Trauma assessment in ED: ABCs intact , GCS 15 , AAOx3.    PAST MEDICAL & SURGICAL HISTORY:  Prostate CA  AF (atrial fibrillation)  Diabetes  HTN (hypertension)  HLD (hyperlipidemia)    Allergies  No Known Allergies    Home Medications:  cyanocobalamin: 1 tab(s) orally once a day (30 Aug 2024 09:46)  dabigatran 150 mg oral capsule: 1 cap(s) orally 2 times a day (30 Aug 2024 14:33)  enzalutamide: 160 milligram(s) (30 Aug 2024 09:43)  folic acid: 1 tab(s) orally once a day (30 Aug 2024 09:46)  gabapentin 600 mg oral tablet: 2 cap(s) orally 2 times a day (30 Aug 2024 14:27)  losartan 100 mg oral tablet: 1 tab(s) orally once a day (30 Aug 2024 09:45)  metFORMIN 500 mg oral tablet: 1 tab(s) orally 2 times a day (30 Aug 2024 14:29)  metoprolol succinate 25 mg oral tablet, extended release: 1 tab(s) orally once a day (30 Aug 2024 09:44)  NIFEdipine 30 mg oral tablet, extended release: 1 tab(s) orally 2 times a day (30 Aug 2024 14:31)  thiamine: 1 tab(s) orally once a day (30 Aug 2024 09:47)      ROS: 10-system review is otherwise negative except HPI above.      Primary Survey:    A - airway intact  B - bilateral breath sounds and good chest rise  C - palpable pulses in all extremities  D - GCS 15 on arrival, RUSSELL  Exposure obtained    Vital Signs Last 24 Hrs  T(C): 36.8 (10 Oct 2024 19:59), Max: 36.8 (10 Oct 2024 19:59)  T(F): 98.2 (10 Oct 2024 19:59), Max: 98.2 (10 Oct 2024 19:59)  HR: 71 (10 Oct 2024 19:59) (71 - 71)  BP: 117/75 (10 Oct 2024 19:59) (117/75 - 117/75)  BP(mean): --  RR: 18 (10 Oct 2024 19:59) (18 - 18)  SpO2: 98% (10 Oct 2024 19:59) (98% - 98%)    Parameters below as of 10 Oct 2024 19:59  Patient On (Oxygen Delivery Method): room air    Secondary Survey:   General: NAD, AAOx3  HEENT: Normocephalic, atraumatic, EOMI. no scalp lacerations   Neck: Soft, midline trachea. no c-spine tenderness  Chest: right lateral (near axilla) chest wall tenderness, no subcutaneous emphysema   Cardiac: RRR  Respiratory: chest rise equal b/l ,no labored breathing  Abdomen: Soft, non-distended, non-tender, no rebound, no guarding.  Groin: Normal appearing, pelvis stable   Ext:  Moving b/l upper and lower extremities. Palpable Radial b/l UE, b/l DP palpable in LE.   Back: No T/L/S spine tenderness, No palpable runoff/stepoff/deformity    Labs: **PENDING**  CAPILLARY BLOOD GLUCOSE  POCT Blood Glucose.: 110 mg/dL (10 Oct 2024 20:18)    RADIOLOGY & ADDITIONAL STUDIES: **PENDING**  ---------------------------------------------------------------------------------------   TRAUMA ACTIVATION LEVEL:  ALERT  ACTIVATED BY: ED  INTUBATED: NO      MECHANISM OF INJURY:   [] Blunt     [] MVC	  [x] Fall	  [] Pedestrian Struck	  [] Motorcycle     [] Assault     [] Bicycle collision    [] Sports injury    [] Penetrating    [] Gun Shot Wound      [] Stab Wound    GCS: 15 	E: 4	V: 5	M: 6    HPI: 84yM w/ PMHx of prostate CA on xtandi, afib on pradaxa, DM, diabetic neuropathy, gout, HTN, HLD seen as Trauma Alert s/p fall off barstool +HT, -LOC, +AC. Patient reports he was at a bar when his foot slipped off the barstool, causing him to fall over onto the ground. Admits to hitting his head, but denies LOC. Per EMS, patient was unable to get up off the ground after the fall. Complaining of pain to the right lateral chest. No other complaints of pain at this time. Trauma assessment in ED: ABCs intact , GCS 15 , AAOx3.    PAST MEDICAL & SURGICAL HISTORY:  Prostate CA  AF (atrial fibrillation)  Diabetes  HTN (hypertension)  HLD (hyperlipidemia)    Allergies  No Known Allergies    Home Medications:  cyanocobalamin: 1 tab(s) orally once a day (30 Aug 2024 09:46)  dabigatran 150 mg oral capsule: 1 cap(s) orally 2 times a day (30 Aug 2024 14:33)  enzalutamide: 160 milligram(s) (30 Aug 2024 09:43)  folic acid: 1 tab(s) orally once a day (30 Aug 2024 09:46)  gabapentin 600 mg oral tablet: 2 cap(s) orally 2 times a day (30 Aug 2024 14:27)  losartan 100 mg oral tablet: 1 tab(s) orally once a day (30 Aug 2024 09:45)  metFORMIN 500 mg oral tablet: 1 tab(s) orally 2 times a day (30 Aug 2024 14:29)  metoprolol succinate 25 mg oral tablet, extended release: 1 tab(s) orally once a day (30 Aug 2024 09:44)  NIFEdipine 30 mg oral tablet, extended release: 1 tab(s) orally 2 times a day (30 Aug 2024 14:31)  thiamine: 1 tab(s) orally once a day (30 Aug 2024 09:47)      ROS: 10-system review is otherwise negative except HPI above.      Primary Survey:    A - airway intact  B - bilateral breath sounds and good chest rise  C - palpable pulses in all extremities  D - GCS 15 on arrival, RUSSELL  Exposure obtained    Vital Signs Last 24 Hrs  T(C): 36.8 (10 Oct 2024 19:59), Max: 36.8 (10 Oct 2024 19:59)  T(F): 98.2 (10 Oct 2024 19:59), Max: 98.2 (10 Oct 2024 19:59)  HR: 71 (10 Oct 2024 19:59) (71 - 71)  BP: 117/75 (10 Oct 2024 19:59) (117/75 - 117/75)  BP(mean): --  RR: 18 (10 Oct 2024 19:59) (18 - 18)  SpO2: 98% (10 Oct 2024 19:59) (98% - 98%)    Parameters below as of 10 Oct 2024 19:59  Patient On (Oxygen Delivery Method): room air    Secondary Survey:   General: NAD, AAOx3  HEENT: Normocephalic, atraumatic, EOMI. no scalp lacerations   Neck: Soft, midline trachea. no c-spine tenderness  Chest: right lateral (near axilla) chest wall tenderness, no subcutaneous emphysema   Cardiac: RRR  Respiratory: chest rise equal b/l ,no labored breathing  Abdomen: Soft, non-distended, non-tender, no rebound, no guarding.  Groin: Normal appearing, pelvis stable   Ext:  Moving b/l upper and lower extremities. Palpable Radial b/l UE, b/l DP palpable in LE.   Back: No T/L/S spine tenderness, No palpable runoff/stepoff/deformity    Labs:  CAPILLARY BLOOD GLUCOSE  POCT Blood Glucose.: 110 mg/dL (10 Oct 2024 20:18)                          14.5   6.29  )-----------( 232      ( 10 Oct 2024 20:24 )             41.8       Auto Neutrophil %: 54.5 % (10-10-24 @ 20:24)  Auto Immature Granulocyte %: 0.5 % (10-10-24 @ 20:24)    10-10    136  |  102  |  16  ----------------------------<  99  4.2   |  17  |  0.6[L]      Calcium: 9.6 mg/dL (10-10-24 @ 20:24)      LFTs:             7.1  | 0.3  | 12       ------------------[82      ( 10 Oct 2024 20:24 )  4.2  | x    | 5           Lipase:61           Lactate, Blood: 5.2 mmol/L (10-10-24 @ 20:24)      Coags:     12.60  ----< 1.10    ( 10 Oct 2024 20:24 )     62.5     Urinalysis Basic - ( 10 Oct 2024 21:47 )    Color: Yellow / Appearance: Clear / S.013 / pH: x  Gluc: x / Ketone: Negative mg/dL  / Bili: Negative / Urobili: 0.2 mg/dL   Blood: x / Protein: Negative mg/dL / Nitrite: Negative   Leuk Esterase: Negative / RBC: x / WBC x   Sq Epi: x / Non Sq Epi: x / Bacteria: x      RADIOLOGY & ADDITIONAL STUDIES:   < from: CT Head No Cont (10.10.24 @ 21:04) >  IMPRESSION:  CT HEAD:  No acute intracranial findings.  CT CERVICAL SPINE:  No acute fracture or dislocation.    < end of copied text >      < from: CT Chest w/ IV Cont (10.10.24 @ 21:14) >  IMPRESSION:    Age-indeterminate compression fracture of L1, new since PET/CT 2024.    Sclerotic lesions in the axial skeleton possibly representing metastatic   disease, unchanged since PET/CT 2024.    < end of copied text >  ---------------------------------------------------------------------------------------

## 2024-10-10 NOTE — ED ADULT NURSE NOTE - OBJECTIVE STATEMENT
[facility]. Pt is on pradaxa for AC.

## 2024-10-17 DIAGNOSIS — Z12.5 ENCOUNTER FOR SCREENING FOR MALIGNANT NEOPLASM OF PROSTATE: ICD-10-CM

## 2024-10-31 ENCOUNTER — APPOINTMENT (OUTPATIENT)
Age: 84
End: 2024-10-31
Payer: MEDICARE

## 2024-10-31 ENCOUNTER — OUTPATIENT (OUTPATIENT)
Dept: OUTPATIENT SERVICES | Facility: HOSPITAL | Age: 84
LOS: 1 days | End: 2024-10-31
Payer: MEDICARE

## 2024-10-31 ENCOUNTER — LABORATORY RESULT (OUTPATIENT)
Age: 84
End: 2024-10-31

## 2024-10-31 VITALS
WEIGHT: 187 LBS | TEMPERATURE: 98 F | SYSTOLIC BLOOD PRESSURE: 178 MMHG | BODY MASS INDEX: 25.33 KG/M2 | HEART RATE: 57 BPM | HEIGHT: 72 IN | OXYGEN SATURATION: 100 % | DIASTOLIC BLOOD PRESSURE: 91 MMHG | RESPIRATION RATE: 16 BRPM

## 2024-10-31 VITALS
OXYGEN SATURATION: 99 % | RESPIRATION RATE: 16 BRPM | HEART RATE: 71 BPM | BODY MASS INDEX: 25.73 KG/M2 | TEMPERATURE: 97.9 F | DIASTOLIC BLOOD PRESSURE: 80 MMHG | SYSTOLIC BLOOD PRESSURE: 129 MMHG | HEIGHT: 72 IN | WEIGHT: 190 LBS

## 2024-10-31 DIAGNOSIS — Z51.11 ENCOUNTER FOR ANTINEOPLASTIC CHEMOTHERAPY: ICD-10-CM

## 2024-10-31 DIAGNOSIS — C61 MALIGNANT NEOPLASM OF PROSTATE: ICD-10-CM

## 2024-10-31 DIAGNOSIS — Z51.12 ENCOUNTER FOR ANTINEOPLASTIC CHEMOTHERAPY: ICD-10-CM

## 2024-10-31 DIAGNOSIS — T50.905A HYPERTROPHY OF BREAST: ICD-10-CM

## 2024-10-31 DIAGNOSIS — N62 HYPERTROPHY OF BREAST: ICD-10-CM

## 2024-10-31 LAB
HCT VFR BLD CALC: 38.8 %
HGB BLD-MCNC: 13.4 G/DL
MCHC RBC-ENTMCNC: 32 PG
MCHC RBC-ENTMCNC: 34.5 G/DL
MCV RBC AUTO: 92.6 FL
PLATELET # BLD AUTO: 222 K/UL
PMV BLD: 10.3 FL
RBC # BLD: 4.19 M/UL
RBC # FLD: 14.9 %
WBC # FLD AUTO: 7.7 K/UL

## 2024-10-31 PROCEDURE — 80053 COMPREHEN METABOLIC PANEL: CPT

## 2024-10-31 PROCEDURE — G2211 COMPLEX E/M VISIT ADD ON: CPT

## 2024-10-31 PROCEDURE — 99214 OFFICE O/P EST MOD 30 MIN: CPT

## 2024-10-31 PROCEDURE — 85027 COMPLETE CBC AUTOMATED: CPT

## 2024-10-31 PROCEDURE — 84153 ASSAY OF PSA TOTAL: CPT

## 2024-10-31 PROCEDURE — 84403 ASSAY OF TOTAL TESTOSTERONE: CPT

## 2024-10-31 RX ORDER — ENZALUTAMIDE 80 MG/1
80 TABLET ORAL DAILY
Qty: 60 | Refills: 3 | Status: ACTIVE | COMMUNITY
Start: 2024-10-31 | End: 1900-01-01

## 2024-10-31 RX ORDER — TAMOXIFEN CITRATE 20 MG/1
20 TABLET, FILM COATED ORAL DAILY
Qty: 30 | Refills: 3 | Status: ACTIVE | COMMUNITY
Start: 2024-10-31 | End: 1900-01-01

## 2024-11-01 DIAGNOSIS — C61 MALIGNANT NEOPLASM OF PROSTATE: ICD-10-CM

## 2024-11-01 LAB
ALBUMIN SERPL ELPH-MCNC: 4.1 G/DL
ALP BLD-CCNC: 78 U/L
ALT SERPL-CCNC: 6 U/L
ANION GAP SERPL CALC-SCNC: 13 MMOL/L
AST SERPL-CCNC: 12 U/L
BILIRUB SERPL-MCNC: 0.7 MG/DL
BUN SERPL-MCNC: 16 MG/DL
CALCIUM SERPL-MCNC: 9.5 MG/DL
CHLORIDE SERPL-SCNC: 104 MMOL/L
CO2 SERPL-SCNC: 23 MMOL/L
CREAT SERPL-MCNC: 0.9 MG/DL
EGFR: 84 ML/MIN/1.73M2
GLUCOSE SERPL-MCNC: 110 MG/DL
POTASSIUM SERPL-SCNC: 5.1 MMOL/L
PROT SERPL-MCNC: 7 G/DL
PSA SERPL-MCNC: 0.74 NG/ML
SODIUM SERPL-SCNC: 140 MMOL/L
TESTOST SERPL-MCNC: <2.5 NG/DL

## 2024-11-21 ENCOUNTER — APPOINTMENT (OUTPATIENT)
Age: 84
End: 2024-11-21

## 2024-12-13 ENCOUNTER — NON-APPOINTMENT (OUTPATIENT)
Age: 84
End: 2024-12-13

## 2024-12-25 PROBLEM — F10.90 ALCOHOL USE: Status: ACTIVE | Noted: 2023-12-27

## 2025-01-22 ENCOUNTER — APPOINTMENT (OUTPATIENT)
Age: 85
End: 2025-01-22

## 2025-01-23 ENCOUNTER — LABORATORY RESULT (OUTPATIENT)
Age: 85
End: 2025-01-23

## 2025-01-23 ENCOUNTER — OUTPATIENT (OUTPATIENT)
Dept: OUTPATIENT SERVICES | Facility: HOSPITAL | Age: 85
LOS: 1 days | End: 2025-01-23
Payer: MEDICARE

## 2025-01-23 ENCOUNTER — APPOINTMENT (OUTPATIENT)
Age: 85
End: 2025-01-23
Payer: MEDICARE

## 2025-01-23 VITALS
HEART RATE: 100 BPM | DIASTOLIC BLOOD PRESSURE: 73 MMHG | BODY MASS INDEX: 25.06 KG/M2 | TEMPERATURE: 97.4 F | WEIGHT: 185 LBS | HEIGHT: 72 IN | SYSTOLIC BLOOD PRESSURE: 135 MMHG | OXYGEN SATURATION: 100 % | RESPIRATION RATE: 16 BRPM

## 2025-01-23 DIAGNOSIS — Z51.11 ENCOUNTER FOR ANTINEOPLASTIC CHEMOTHERAPY: ICD-10-CM

## 2025-01-23 DIAGNOSIS — C61 MALIGNANT NEOPLASM OF PROSTATE: ICD-10-CM

## 2025-01-23 DIAGNOSIS — Z51.12 ENCOUNTER FOR ANTINEOPLASTIC CHEMOTHERAPY: ICD-10-CM

## 2025-01-23 LAB
HCT VFR BLD CALC: 37.6 %
HGB BLD-MCNC: 13.3 G/DL
MCHC RBC-ENTMCNC: 32.3 PG
MCHC RBC-ENTMCNC: 35.4 G/DL
MCV RBC AUTO: 91.3 FL
PLATELET # BLD AUTO: 212 K/UL
PMV BLD: 10 FL
RBC # BLD: 4.12 M/UL
RBC # FLD: 12.7 %
WBC # FLD AUTO: 5.97 K/UL

## 2025-01-23 PROCEDURE — G2211 COMPLEX E/M VISIT ADD ON: CPT

## 2025-01-23 PROCEDURE — 84403 ASSAY OF TOTAL TESTOSTERONE: CPT

## 2025-01-23 PROCEDURE — 84153 ASSAY OF PSA TOTAL: CPT

## 2025-01-23 PROCEDURE — 99214 OFFICE O/P EST MOD 30 MIN: CPT

## 2025-01-23 PROCEDURE — 80053 COMPREHEN METABOLIC PANEL: CPT

## 2025-01-23 PROCEDURE — 85027 COMPLETE CBC AUTOMATED: CPT

## 2025-01-24 DIAGNOSIS — C61 MALIGNANT NEOPLASM OF PROSTATE: ICD-10-CM

## 2025-01-27 LAB
ALBUMIN SERPL ELPH-MCNC: 3.9 G/DL
ALP BLD-CCNC: 70 U/L
ALT SERPL-CCNC: 8 U/L
ANION GAP SERPL CALC-SCNC: 13 MMOL/L
AST SERPL-CCNC: 15 U/L
BILIRUB SERPL-MCNC: 0.7 MG/DL
BUN SERPL-MCNC: 18 MG/DL
CALCIUM SERPL-MCNC: 9.7 MG/DL
CHLORIDE SERPL-SCNC: 103 MMOL/L
CO2 SERPL-SCNC: 23 MMOL/L
CREAT SERPL-MCNC: 1 MG/DL
EGFR: 74 ML/MIN/1.73M2
GLUCOSE SERPL-MCNC: 176 MG/DL
POTASSIUM SERPL-SCNC: 4.8 MMOL/L
PROT SERPL-MCNC: 6.8 G/DL
PSA SERPL-MCNC: 1.24 NG/ML
SODIUM SERPL-SCNC: 139 MMOL/L
TESTOST SERPL-MCNC: <2.5 NG/DL

## 2025-03-13 ENCOUNTER — NON-APPOINTMENT (OUTPATIENT)
Age: 85
End: 2025-03-13

## 2025-03-18 ENCOUNTER — NON-APPOINTMENT (OUTPATIENT)
Age: 85
End: 2025-03-18

## 2025-04-29 ENCOUNTER — APPOINTMENT (OUTPATIENT)
Age: 85
End: 2025-04-29
Payer: MEDICARE

## 2025-04-29 ENCOUNTER — OUTPATIENT (OUTPATIENT)
Dept: OUTPATIENT SERVICES | Facility: HOSPITAL | Age: 85
LOS: 1 days | End: 2025-04-29
Payer: MEDICARE

## 2025-04-29 VITALS
OXYGEN SATURATION: 100 % | SYSTOLIC BLOOD PRESSURE: 119 MMHG | WEIGHT: 193 LBS | HEIGHT: 72 IN | BODY MASS INDEX: 26.14 KG/M2 | HEART RATE: 70 BPM | RESPIRATION RATE: 16 BRPM | DIASTOLIC BLOOD PRESSURE: 70 MMHG | TEMPERATURE: 97.6 F

## 2025-04-29 DIAGNOSIS — Z51.12 ENCOUNTER FOR ANTINEOPLASTIC CHEMOTHERAPY: ICD-10-CM

## 2025-04-29 DIAGNOSIS — C61 MALIGNANT NEOPLASM OF PROSTATE: ICD-10-CM

## 2025-04-29 DIAGNOSIS — Z51.11 ENCOUNTER FOR ANTINEOPLASTIC CHEMOTHERAPY: ICD-10-CM

## 2025-04-29 LAB
AUTO BASOPHILS #: 0.05 K/UL
AUTO BASOPHILS %: 0.8 %
AUTO EOSINOPHILS #: 0.44 K/UL
AUTO EOSINOPHILS %: 7 %
AUTO IMMATURE GRANULOCYTES #: 0.02 K/UL
AUTO LYMPHOCYTES #: 1.56 K/UL
AUTO LYMPHOCYTES %: 24.8 %
AUTO MONOCYTES #: 0.84 K/UL
AUTO MONOCYTES %: 13.4 %
AUTO NEUTROPHILS #: 3.37 K/UL
AUTO NEUTROPHILS %: 53.7 %
AUTO NRBC #: 0 K/UL
HCT VFR BLD CALC: 37.6 %
HGB BLD-MCNC: 13.4 G/DL
IMM GRANULOCYTES NFR BLD AUTO: 0.3 %
MAN DIFF?: NORMAL
MCHC RBC-ENTMCNC: 33.1 PG
MCHC RBC-ENTMCNC: 35.6 G/DL
MCV RBC AUTO: 92.8 FL
PLATELET # BLD AUTO: 185 K/UL
PMV BLD AUTO: 0 /100 WBCS
PMV BLD: 9.9 FL
RBC # BLD: 4.05 M/UL
RBC # FLD: 13.6 %
WBC # FLD AUTO: 6.28 K/UL

## 2025-04-29 PROCEDURE — 84153 ASSAY OF PSA TOTAL: CPT

## 2025-04-29 PROCEDURE — G2211 COMPLEX E/M VISIT ADD ON: CPT

## 2025-04-29 PROCEDURE — 99214 OFFICE O/P EST MOD 30 MIN: CPT

## 2025-04-29 PROCEDURE — 80053 COMPREHEN METABOLIC PANEL: CPT

## 2025-04-29 PROCEDURE — 85025 COMPLETE CBC W/AUTO DIFF WBC: CPT

## 2025-04-29 PROCEDURE — 84403 ASSAY OF TOTAL TESTOSTERONE: CPT

## 2025-04-30 DIAGNOSIS — C61 MALIGNANT NEOPLASM OF PROSTATE: ICD-10-CM

## 2025-04-30 LAB
ALBUMIN SERPL ELPH-MCNC: 4 G/DL
ALP BLD-CCNC: 68 U/L
ALT SERPL-CCNC: 6 U/L
ANION GAP SERPL CALC-SCNC: 12 MMOL/L
AST SERPL-CCNC: 13 U/L
BILIRUB SERPL-MCNC: 0.5 MG/DL
BUN SERPL-MCNC: 13 MG/DL
CALCIUM SERPL-MCNC: 9.3 MG/DL
CHLORIDE SERPL-SCNC: 102 MMOL/L
CO2 SERPL-SCNC: 23 MMOL/L
CREAT SERPL-MCNC: 0.8 MG/DL
EGFRCR SERPLBLD CKD-EPI 2021: 87 ML/MIN/1.73M2
GLUCOSE SERPL-MCNC: 89 MG/DL
POTASSIUM SERPL-SCNC: 4.9 MMOL/L
PROT SERPL-MCNC: 7.2 G/DL
PSA SERPL-MCNC: 2.44 NG/ML
SODIUM SERPL-SCNC: 137 MMOL/L
TESTOST SERPL-MCNC: <2.5 NG/DL

## 2025-07-17 ENCOUNTER — APPOINTMENT (OUTPATIENT)
Age: 85
End: 2025-07-17
Payer: MEDICARE

## 2025-07-17 VITALS
TEMPERATURE: 98 F | HEART RATE: 76 BPM | SYSTOLIC BLOOD PRESSURE: 120 MMHG | WEIGHT: 194 LBS | BODY MASS INDEX: 26.28 KG/M2 | OXYGEN SATURATION: 97 % | DIASTOLIC BLOOD PRESSURE: 65 MMHG | HEIGHT: 72 IN

## 2025-07-17 LAB
AUTO BASOPHILS #: 0.05 K/UL
AUTO BASOPHILS %: 0.7 %
AUTO EOSINOPHILS #: 0.42 K/UL
AUTO EOSINOPHILS %: 5.9 %
AUTO IMMATURE GRANULOCYTES #: 0.02 K/UL
AUTO LYMPHOCYTES #: 1.79 K/UL
AUTO LYMPHOCYTES %: 25.3 %
AUTO MONOCYTES #: 0.71 K/UL
AUTO MONOCYTES %: 10 %
AUTO NEUTROPHILS #: 4.09 K/UL
AUTO NEUTROPHILS %: 57.8 %
AUTO NRBC #: 0 K/UL
HCT VFR BLD CALC: 36.9 %
HGB BLD-MCNC: 13 G/DL
IMM GRANULOCYTES NFR BLD AUTO: 0.3 %
MAN DIFF?: NORMAL
MCHC RBC-ENTMCNC: 33.5 PG
MCHC RBC-ENTMCNC: 35.2 G/DL
MCV RBC AUTO: 95.1 FL
PLATELET # BLD AUTO: 191 K/UL
PMV BLD AUTO: 0 /100 WBCS
PMV BLD: 9.8 FL
RBC # BLD: 3.88 M/UL
RBC # FLD: 13.7 %
WBC # FLD AUTO: 7.08 K/UL

## 2025-07-17 PROCEDURE — 99214 OFFICE O/P EST MOD 30 MIN: CPT

## 2025-07-17 PROCEDURE — G2211 COMPLEX E/M VISIT ADD ON: CPT

## 2025-07-18 LAB
ALBUMIN SERPL ELPH-MCNC: 5.5 G/DL
ALP BLD-CCNC: 56 U/L
ALT SERPL-CCNC: 9 U/L
ANION GAP SERPL CALC-SCNC: 14 MMOL/L
AST SERPL-CCNC: 20 U/L
BILIRUB SERPL-MCNC: 0.7 MG/DL
BUN SERPL-MCNC: 16 MG/DL
CALCIUM SERPL-MCNC: 9.2 MG/DL
CHLORIDE SERPL-SCNC: 102 MMOL/L
CO2 SERPL-SCNC: 21 MMOL/L
CREAT SERPL-MCNC: 0.8 MG/DL
EGFRCR SERPLBLD CKD-EPI 2021: 87 ML/MIN/1.73M2
GLUCOSE SERPL-MCNC: 92 MG/DL
POTASSIUM SERPL-SCNC: 4.6 MMOL/L
PROT SERPL-MCNC: 6.6 G/DL
PSA SERPL-MCNC: 2.72 NG/ML
SODIUM SERPL-SCNC: 137 MMOL/L
TESTOST SERPL-MCNC: <2.5 NG/DL